# Patient Record
Sex: MALE | Race: WHITE | NOT HISPANIC OR LATINO | Employment: FULL TIME | URBAN - METROPOLITAN AREA
[De-identification: names, ages, dates, MRNs, and addresses within clinical notes are randomized per-mention and may not be internally consistent; named-entity substitution may affect disease eponyms.]

---

## 2018-02-13 ENCOUNTER — OFFICE VISIT CONVERTED (OUTPATIENT)
Dept: NEUROSURGERY | Facility: CLINIC | Age: 33
End: 2018-02-13
Attending: PHYSICIAN ASSISTANT

## 2018-03-05 ENCOUNTER — OFFICE VISIT CONVERTED (OUTPATIENT)
Dept: NEUROSURGERY | Facility: CLINIC | Age: 33
End: 2018-03-05
Attending: PHYSICIAN ASSISTANT

## 2018-04-24 ENCOUNTER — OFFICE VISIT CONVERTED (OUTPATIENT)
Dept: INTERNAL MEDICINE | Facility: CLINIC | Age: 33
End: 2018-04-24
Attending: INTERNAL MEDICINE

## 2018-05-14 ENCOUNTER — OFFICE VISIT CONVERTED (OUTPATIENT)
Dept: INTERNAL MEDICINE | Facility: CLINIC | Age: 33
End: 2018-05-14
Attending: INTERNAL MEDICINE

## 2018-06-14 ENCOUNTER — CONVERSION ENCOUNTER (OUTPATIENT)
Dept: INTERNAL MEDICINE | Facility: CLINIC | Age: 33
End: 2018-06-14

## 2018-06-14 ENCOUNTER — OFFICE VISIT CONVERTED (OUTPATIENT)
Dept: INTERNAL MEDICINE | Facility: CLINIC | Age: 33
End: 2018-06-14
Attending: INTERNAL MEDICINE

## 2018-09-12 ENCOUNTER — OFFICE VISIT CONVERTED (OUTPATIENT)
Dept: INTERNAL MEDICINE | Facility: CLINIC | Age: 33
End: 2018-09-12
Attending: INTERNAL MEDICINE

## 2018-10-09 ENCOUNTER — OFFICE VISIT CONVERTED (OUTPATIENT)
Dept: INTERNAL MEDICINE | Facility: CLINIC | Age: 33
End: 2018-10-09
Attending: INTERNAL MEDICINE

## 2019-02-12 ENCOUNTER — HOSPITAL ENCOUNTER (OUTPATIENT)
Dept: FAMILY MEDICINE CLINIC | Facility: CLINIC | Age: 34
Discharge: HOME | End: 2019-02-12
Attending: NURSE PRACTITIONER

## 2019-02-12 ENCOUNTER — OFFICE VISIT CONVERTED (OUTPATIENT)
Dept: FAMILY MEDICINE CLINIC | Facility: CLINIC | Age: 34
End: 2019-02-12
Attending: NURSE PRACTITIONER

## 2019-02-14 LAB — BACTERIA SPEC AEROBE CULT: NORMAL

## 2019-05-10 ENCOUNTER — OFFICE VISIT CONVERTED (OUTPATIENT)
Dept: FAMILY MEDICINE CLINIC | Facility: CLINIC | Age: 34
End: 2019-05-10
Attending: NURSE PRACTITIONER

## 2019-05-10 ENCOUNTER — CONVERSION ENCOUNTER (OUTPATIENT)
Dept: FAMILY MEDICINE CLINIC | Facility: CLINIC | Age: 34
End: 2019-05-10

## 2019-05-16 ENCOUNTER — HOSPITAL ENCOUNTER (OUTPATIENT)
Dept: OTHER | Facility: HOSPITAL | Age: 34
Discharge: HOME | End: 2019-05-16
Attending: NURSE PRACTITIONER

## 2019-05-23 ENCOUNTER — CONVERSION ENCOUNTER (OUTPATIENT)
Dept: SURGERY | Facility: CLINIC | Age: 34
End: 2019-05-23

## 2019-05-23 ENCOUNTER — OFFICE VISIT CONVERTED (OUTPATIENT)
Dept: SURGERY | Facility: CLINIC | Age: 34
End: 2019-05-23
Attending: SURGERY

## 2019-05-31 ENCOUNTER — HOSPITAL ENCOUNTER (OUTPATIENT)
Dept: PERIOP | Facility: HOSPITAL | Age: 34
Setting detail: HOSPITAL OUTPATIENT SURGERY
Discharge: HOME | End: 2019-05-31
Attending: SURGERY

## 2019-06-06 ENCOUNTER — CONVERSION ENCOUNTER (OUTPATIENT)
Dept: SURGERY | Facility: CLINIC | Age: 34
End: 2019-06-06

## 2019-06-06 ENCOUNTER — OFFICE VISIT CONVERTED (OUTPATIENT)
Dept: SURGERY | Facility: CLINIC | Age: 34
End: 2019-06-06
Attending: SURGERY

## 2019-08-23 ENCOUNTER — CONVERSION ENCOUNTER (OUTPATIENT)
Dept: FAMILY MEDICINE CLINIC | Facility: CLINIC | Age: 34
End: 2019-08-23

## 2019-08-23 ENCOUNTER — OFFICE VISIT CONVERTED (OUTPATIENT)
Dept: FAMILY MEDICINE CLINIC | Facility: CLINIC | Age: 34
End: 2019-08-23
Attending: NURSE PRACTITIONER

## 2019-08-23 ENCOUNTER — HOSPITAL ENCOUNTER (OUTPATIENT)
Dept: FAMILY MEDICINE CLINIC | Facility: CLINIC | Age: 34
Discharge: HOME | End: 2019-08-23
Attending: NURSE PRACTITIONER

## 2019-08-23 LAB
25(OH)D3 SERPL-MCNC: 39.1 NG/ML (ref 30–100)
CK SERPL-CCNC: 190 U/L (ref 57–374)
FERRITIN SERPL-MCNC: 115 NG/ML (ref 30–300)
FOLATE SERPL-MCNC: 9.1 NG/ML (ref 4.8–20)
IRON SATN MFR SERPL: 43 % (ref 20–55)
IRON SERPL-MCNC: 234 UG/DL (ref 70–180)
T4 FREE SERPL-MCNC: 1 NG/DL (ref 0.9–1.8)
TIBC SERPL-MCNC: 539 UG/DL (ref 245–450)
TRANSFERRIN SERPL-MCNC: 377 MG/DL (ref 215–365)
TSH SERPL-ACNC: 1.42 M[IU]/L (ref 0.27–4.2)
VIT B12 SERPL-MCNC: 619 PG/ML (ref 211–911)

## 2019-09-07 ENCOUNTER — OFFICE VISIT CONVERTED (OUTPATIENT)
Dept: FAMILY MEDICINE CLINIC | Facility: CLINIC | Age: 34
End: 2019-09-07
Attending: NURSE PRACTITIONER

## 2019-09-07 ENCOUNTER — HOSPITAL ENCOUNTER (OUTPATIENT)
Dept: FAMILY MEDICINE CLINIC | Facility: CLINIC | Age: 34
Discharge: HOME | End: 2019-09-07
Attending: NURSE PRACTITIONER

## 2019-09-16 LAB — CONV HEMOCHROMATOSIS MUTATION (C282Y,H63D,565C): NORMAL

## 2019-10-01 ENCOUNTER — OFFICE VISIT CONVERTED (OUTPATIENT)
Dept: FAMILY MEDICINE CLINIC | Facility: CLINIC | Age: 34
End: 2019-10-01
Attending: NURSE PRACTITIONER

## 2020-04-04 ENCOUNTER — TELEMEDICINE CONVERTED (OUTPATIENT)
Dept: FAMILY MEDICINE CLINIC | Facility: CLINIC | Age: 35
End: 2020-04-04
Attending: NURSE PRACTITIONER

## 2020-12-09 ENCOUNTER — HOSPITAL ENCOUNTER (OUTPATIENT)
Dept: FAMILY MEDICINE CLINIC | Facility: CLINIC | Age: 35
Discharge: HOME | End: 2020-12-09
Attending: NURSE PRACTITIONER

## 2020-12-09 ENCOUNTER — OFFICE VISIT CONVERTED (OUTPATIENT)
Dept: FAMILY MEDICINE CLINIC | Facility: CLINIC | Age: 35
End: 2020-12-09
Attending: NURSE PRACTITIONER

## 2020-12-09 LAB
ALBUMIN SERPL-MCNC: 5.1 G/DL (ref 3.5–5)
ALBUMIN/GLOB SERPL: 1.8 {RATIO} (ref 1.4–2.6)
ALP SERPL-CCNC: 81 U/L (ref 53–128)
ALT SERPL-CCNC: 37 U/L (ref 10–40)
ANION GAP SERPL CALC-SCNC: 16 MMOL/L (ref 8–19)
AST SERPL-CCNC: 32 U/L (ref 15–50)
BASOPHILS # BLD AUTO: 0.03 10*3/UL (ref 0–0.2)
BASOPHILS NFR BLD AUTO: 0.5 % (ref 0–3)
BILIRUB SERPL-MCNC: 0.41 MG/DL (ref 0.2–1.3)
BUN SERPL-MCNC: 11 MG/DL (ref 5–25)
BUN/CREAT SERPL: 9 {RATIO} (ref 6–20)
CALCIUM SERPL-MCNC: 10.1 MG/DL (ref 8.7–10.4)
CHLORIDE SERPL-SCNC: 103 MMOL/L (ref 99–111)
CONV ABS IMM GRAN: 0.01 10*3/UL (ref 0–0.2)
CONV CO2: 28 MMOL/L (ref 22–32)
CONV IMMATURE GRAN: 0.2 % (ref 0–1.8)
CONV TOTAL PROTEIN: 7.9 G/DL (ref 6.3–8.2)
CREAT UR-MCNC: 1.23 MG/DL (ref 0.7–1.2)
DEPRECATED RDW RBC AUTO: 41 FL (ref 35.1–43.9)
EOSINOPHIL # BLD AUTO: 0.28 10*3/UL (ref 0–0.7)
EOSINOPHIL # BLD AUTO: 4.8 % (ref 0–7)
ERYTHROCYTE [DISTWIDTH] IN BLOOD BY AUTOMATED COUNT: 12.6 % (ref 11.6–14.4)
FERRITIN SERPL-MCNC: 133 NG/ML (ref 30–300)
GFR SERPLBLD BASED ON 1.73 SQ M-ARVRAT: >60 ML/MIN/{1.73_M2}
GLOBULIN UR ELPH-MCNC: 2.8 G/DL (ref 2–3.5)
GLUCOSE SERPL-MCNC: 55 MG/DL (ref 70–99)
HCT VFR BLD AUTO: 50.9 % (ref 42–52)
HGB BLD-MCNC: 17.5 G/DL (ref 14–18)
IRON SATN MFR SERPL: 30 % (ref 20–55)
IRON SERPL-MCNC: 151 UG/DL (ref 70–180)
LYMPHOCYTES # BLD AUTO: 1.64 10*3/UL (ref 1–5)
LYMPHOCYTES NFR BLD AUTO: 28.3 % (ref 20–45)
MCH RBC QN AUTO: 30.9 PG (ref 27–31)
MCHC RBC AUTO-ENTMCNC: 34.4 G/DL (ref 33–37)
MCV RBC AUTO: 89.8 FL (ref 80–96)
MONOCYTES # BLD AUTO: 0.39 10*3/UL (ref 0.2–1.2)
MONOCYTES NFR BLD AUTO: 6.7 % (ref 3–10)
NEUTROPHILS # BLD AUTO: 3.44 10*3/UL (ref 2–8)
NEUTROPHILS NFR BLD AUTO: 59.5 % (ref 30–85)
NRBC CBCN: 0 % (ref 0–0.7)
OSMOLALITY SERPL CALC.SUM OF ELEC: 291 MOSM/KG (ref 273–304)
PLATELET # BLD AUTO: 248 10*3/UL (ref 130–400)
PMV BLD AUTO: 10.7 FL (ref 9.4–12.4)
POTASSIUM SERPL-SCNC: 4.5 MMOL/L (ref 3.5–5.3)
RBC # BLD AUTO: 5.67 10*6/UL (ref 4.7–6.1)
SODIUM SERPL-SCNC: 142 MMOL/L (ref 135–147)
T4 FREE SERPL-MCNC: 1.2 NG/DL (ref 0.9–1.8)
TIBC SERPL-MCNC: 509 UG/DL (ref 245–450)
TRANSFERRIN SERPL-MCNC: 356 MG/DL (ref 215–365)
TSH SERPL-ACNC: 1.89 M[IU]/L (ref 0.27–4.2)
WBC # BLD AUTO: 5.79 10*3/UL (ref 4.8–10.8)

## 2021-05-07 NOTE — PROGRESS NOTES
Progress Note      Patient Name: Devendra Rodriguez   Patient ID: [id number]   Sex: Male   YOB: 1985    Primary Care Provider: Dinorah PATE   Referring Provider: Dinorah PATE    Visit Date: December 9, 2020    Provider: HERLINDA Carranza   Location: [facility]   Location Address: [address] [place] [address] [place]  [address]   Location Phone: [phone]          Chief Complaint     discuss meds       History Of Present Illness  Devendra Rodriguez is a 35 year old /White male who presents for evaluation and treatment of:      follow up on anxiety - he has been  out of his medications for a while now - reports feeling anxious and tense all of the time - his muscles hurt d/t being so tense - he can just snap at any moment and that is not good for his kids or his relationship - in a new relationship for almost 1 year following divorce from his wife - they had been together since . They are sharing custody 50-50 right now- he actually hasn't spoken to his ex-wife in months.     He does not want to continue Zoloft - states that it felt like being on a really bad 'downer' - he had taken Pristiq for about 4 years prior to that and just didn't feel like it worked like he needed it to. He has not tried any others that he can recall. He did try the Topamax but states it didn't really help with the nightmares/night terrors - it also made him really sleepy and he can't afford to not wake up and miss work or be late for work. When he goes to bed he falls asleep okay, but when he wakes up in the middle of the night he cannot shut his brain off.     PHQ-9 score is 11. Denies HI/SI.     Labs from last year reviewed - elevated iron studies. Normal TSH.       Past Medical History  Disease Name Date Onset Notes   Anxiety --  --    GERD (gastroesophageal reflux disease) --  --    Hypertension, Benign Essential --  --          Past Surgical History  Procedure Name Date  Notes   EGD (Endoscopy) 1-23-17 Dr. Mishra         Allergy List  Allergen Name Date Reaction Notes   NO KNOWN DRUG ALLERGIES --  --  --          Social History  Finding Status Start/Stop Quantity Notes   Alcohol Current every day --/-- --  drinks alcohol, 8-14 drinks per week   Exercises regularly --  --/-- --  occasionally   Recreational Drug Use Never --/-- --  never used   Tobacco Current every day --/-- --  currently uses other tobacco products   Uses seatbelts --  --/-- --  yes         Review of Systems  · Constitutional  o Admits  o : fatigue  o Denies  o : fever, chills, body aches  · HENT  o Denies  o : headaches  · Cardiovascular  o Denies  o : chest pain, syncope, dyspnea on exertion, lower extremity edema, lightheadedness, palpitations  · Respiratory  o Denies  o : shortness of breath, cough  · Gastrointestinal  o Denies  o : nausea, vomiting, diarrhea, loss of appetite, abdominal pain  · Integument  o Denies  o : rash, pigmentation changes, skin dryness, hair growth change, nail changes  · Neurologic  o Denies  o : dizziness  · Musculoskeletal  o Admits  o : muscle pain - from tension  o Denies  o : joint pain, joint swelling  · Psychiatric  o Admits  o : anxiety, depression, difficulty sleeping, excessive anger  o Denies  o : suicidal ideation, homicidal ideation      Vitals  Date Time BP Position Site L\R Cuff Size HR RR TEMP (F) WT  HT  BMI kg/m2 BSA m2 O2 Sat FR L/min FiO2 HC       12/09/2020 08:09 /84 Sitting    110 - R   178lbs 0oz    97 %            Physical Examination  · Constitutional  o Appearance  o : well developed, well-nourished, in no acute distress  · Neck  o Inspection/Palpation  o : normal appearance, no masses or tenderness, trachea midline  o Thyroid  o : no thyromegaly  · Respiratory  o Respiratory Effort  o : breathing unlabored  o Auscultation of Lungs  o : clear to auscultation  · Cardiovascular  o Heart  o :   § Auscultation of Heart  § : regular rate, normal rhythm, no  murmurs present  o Peripheral Vascular System  o :   § Extremities  § : no edema  · Lymphatic  o Neck  o : no lymphadenopathy present  · Musculoskeletal  o General  o :   § General Musculoskeletal  § : Muscle tone, strength, and development grossly normal.  · Skin and Subcutaneous Tissue  o General Inspection  o : no lesions present, no areas of discoloration, skin turgor normal, texture normal  · Neurologic  o Gait and Station  o :   § Gait Screening  § : normal gait  · Psychiatric  o Mood and Affect  o : mood normal, affect appropriate          Assessment  · Anxiety disorder     300.00/F41.9  · Fatigue     780.79/R53.83  · Depression     311/F32.9  · Abnormal iron saturation     790.6/R79.0      Plan  · Orders  o CBC with Auto Diff [facility] (19548) - 300.00/F41.9, 311/F32.9, 790.6/R79.0, 780.79/R53.83 - 12/09/2020  o CMP [facility] (50516) - 300.00/F41.9, 311/F32.9 - 12/09/2020  o Thyroid Profile (THYII) - 300.00/F41.9, 311/F32.9, 780.79/R53.83 - 12/09/2020  o ACO-18: Positive screen for clinical depression using a standardized tool and a follow-up plan documented () - - 12/09/2020  o ACO-39: Current medications updated and reviewed (1159F, ) - - 12/09/2020  o Iron Profile (Iron 01880 TIBC 45779 and Transferrin 43942) (IRONP) - 790.6/R79.0, 780.79/R53.83 - 12/09/2020  o Ferritin ser/plas (34248) - 790.6/R79.0, 780.79/R53.83 - 12/09/2020  · Medications  o buspirone 10 mg oral tablet   SIG: take 1 tablet (10 mg) by oral route 2 times per day   DISP: (60) Tablet with 0 refills  Prescribed on 12/09/2020     o escitalopram oxalate 10 mg oral tablet   SIG: take 1 tablet (10 mg) by oral route once daily   DISP: (30) Tablet with 0 refills  Prescribed on 12/09/2020     o buspirone 10 mg oral tablet   SIG: take 1 tablet (10 mg) by oral route 2 times per day   DISP: (60) tablets with 2 refills  Discontinued on 12/09/2020     o clotrimazole 1 % topical cream   SIG: apply to the affected and surrounding areas of skin by topical  route 2 times per day in the morning and evening for 14 days   DISP: (1) 15 gm tube with 0 refills  Discontinued on 12/09/2020     o lisinopril 20 mg oral tablet   SIG: take 1 tablet (20 mg) by oral route once daily for 30 days   DISP: (30) Tablet with 2 refills  Discontinued on 12/09/2020     o naproxen 500 mg oral tablet   SIG: take 1 tablet (500 mg) by oral route 2 times per day with food   DISP: (60) tablets with 2 refills  Discontinued on 12/09/2020     o omeprazole 40 mg oral capsule,delayed release(DR/EC)   SIG: take 1 capsule (40 mg) by oral route once daily before a meal   DISP: (30) capsules with 2 refills  Discontinued on 12/09/2020     o sertraline 100 mg oral tablet   SIG: take 1 tablet (100 mg) by oral route once daily for 30 days   DISP: (30) Tablet with 2 refills  Discontinued on 12/09/2020     o topiramate 25 mg oral tablet   SIG: take 1 tablet (25 mg) by oral route once daily at bedtime   DISP: (30) tablets with 0 refills  Discontinued on 12/09/2020     o Medications have been Reconciled  o Transition of Care or Provider Policy  · Instructions  o Patient was educated/instructed on their diagnosis, treatment and medications prior to discharge from the clinic today. Restart Buspar - he has tolerated this well in the past and notes that it does help with his symptoms - Trial of Lexapro - follow up with me in one month. Denies any HI/SI. At this time, we will defer medication for insomnia - he may attempt Benadryl if desired; however, he states even melatonin knocks him out.             Electronically Signed by: HERLINDA Carranza -Author on December 9, 2020 08:41:25 AM

## 2021-05-07 NOTE — PROGRESS NOTES
Progress Note      Patient Name: Devendra Rodriguez   Patient ID: [id number]   Sex: Male   YOB: 1985    Primary Care Provider: Dinorah PATE   Referring Provider: Dinorah PATE    Visit Date: September 7, 2019    Provider: HERLINDA Carranza   Location: [facility]   Location Address: [address] [place] [address] [place]  [address]   Location Phone: [phone]          Chief Complaint     follow up       History Of Present Illness  Devendra Rodriguez is a 33 year old /White male who presents for evaluation and treatment of:      follow up on anxiety and blood pressure     He can tell a difference in everything since he was last here -- he reports 100% improvement since he was here 2 weeks ago, but 50% better overall/general symptoms.     No c/o chest pain, palpitations, headaches, dizziness, or fatigue.     His blood work showed iron overload. He is not taking an iron supplement.       Past Medical History  Disease Name Date Onset Notes   Anxiety --  --    GERD (gastroesophageal reflux disease) --  --          Past Surgical History  Procedure Name Date Notes   EGD (Endoscopy) 1-23-17 Dr. Mishra         Medication List  Name Date Started Instructions   buspirone 10 mg oral tablet 08/23/2019 take 1 tablet (10 mg) by oral route 2 times per day   desvenlafaxine succinate 50 mg oral tablet extended release 24 hr 08/23/2019 take 1 tablet (50 mg) by oral route once daily   lisinopril 10 mg oral tablet 08/23/2019 take 1 tablet (10 mg) by oral route once daily   naproxen 500 mg oral tablet 07/05/2019 take 1 tablet (500 mg) by oral route 2 times per day with food   omeprazole 40 mg oral capsule,delayed release(/EC) 05/10/2019 take 1 capsule (40 mg) by oral route once daily before a meal   sertraline 50 mg oral tablet 08/23/2019 take 1 tablet (50 mg) by oral route once daily         Allergy List  Allergen Name Date Reaction Notes   NO KNOWN DRUG ALLERGIES --  --   --          Social History  Finding Status Start/Stop Quantity Notes   Alcohol Current every day --/-- --  drinks alcohol, 8-14 drinks per week   Exercises regularly --  --/-- --  occasionally   Recreational Drug Use Never --/-- --  never used   Tobacco Current every day --/-- --  currently uses other tobacco products   Uses seatbelts --  --/-- --  yes         Review of Systems  · Constitutional  o Admits  o : good general health lately  · Cardiovascular  o Denies  o : chest pain, dyspnea on exertion, lower extremity edema, palpitations  · Respiratory  o Denies  o : shortness of breath  · Gastrointestinal  o Denies  o : nausea, vomiting, diarrhea, abdominal pain  · Neurologic  o Denies  o : dizziness  · Musculoskeletal  o Denies  o : joint pain, joint swelling  · Psychiatric  o Admits  o : anxiety  o Denies  o : depression, difficulty sleeping, suicidal ideation, homicidal ideation      Vitals  Date Time BP Position Site L\R Cuff Size HR RR TEMP (F) WT  HT  BMI kg/m2 BSA m2 O2 Sat        09/07/2019 08:57 /84 Sitting    96 - R   168lbs 0oz    99 %          Physical Examination  · Constitutional  o Appearance  o : well developed, well-nourished, in no acute distress  · Respiratory  o Respiratory Effort  o : breathing unlabored  o Auscultation of Lungs  o : clear to ascultation  · Cardiovascular  o Heart  o :   § Auscultation of Heart  § : regular rate, normal rhythm, no murmurs present  o Peripheral Vascular System  o :   § Carotid Arteries  § : normal pulses bilaterally, no bruits present  § Pedal Pulses  § : bilateral pulse strength 2+  § Extremities  § : no edema  · Lymphatic  o Neck  o : no lymphadenopathy present  · Musculoskeletal  o General  o :   § General Musculoskeletal  § : Muscle tone, strength, and development grossly normal.  · Skin and Subcutaneous Tissue  o General Inspection  o : no lesions present, no areas of discoloration, skin turgor normal, texture normal  · Neurologic  o Gait and  "Station  o :   § Gait Screening  § : normal gait  · Psychiatric  o Mood and Affect  o : mood normal, affect appropriate          Assessment  · Anxiety disorder     300.00/F41.9  · Essential hypertension     401.9/I10  · Abnormal iron saturation     790.6/R79.0      Plan  · Orders  o ACO-39: Current medications updated and reviewed () - - 09/07/2019  o Hemochromatosis (97256) - 790.6/R79.0 - 09/07/2019  · Medications  o desvenlafaxine succinate 25 mg oral tablet extended release 24 hr   SIG: take 1 tablet by oral route daily   DISP: (14) tablets with 0 refills  Adjusted on 09/07/2019     o sertraline 100 mg oral tablet   SIG: take 1 tablet (100 mg) by oral route once daily   DISP: (30) tablets with 0 refills  Adjusted on 09/07/2019     o buspirone 10 mg oral tablet   SIG: take 1 tablet (10 mg) by oral route 2 times per day   DISP: (60) tablets with 0 refills  Refilled on 09/07/2019     o lisinopril 10 mg oral tablet   SIG: take 1 tablet (10 mg) by oral route once daily   DISP: (30) tablets with 0 refills  Refilled on 09/07/2019     o omeprazole 40 mg oral capsule,delayed release(DR/EC)   SIG: take 1 capsule (40 mg) by oral route once daily before a meal   DISP: (30) capsules with 5 refills  Refilled on 09/07/2019     · Instructions  o Patient was given an SSRI/SSNRI medication and warned of possible side effects of the medication including potential for increase risk of sucicidal thoughts and feelings. Patient was instructed that if they begin to exhibit any of these effects they will discontinue the medication immediately and contact our office or the ER ASAP.   o Patient agrees to a \"No Self Harm\" contract. Patient will either call us, [facility], ER, [facility], [facility].  o Take all medications as prescribed/directed.  o Patient was educated/instructed on their diagnosis, treatment and medications prior to discharge from the clinic today.  o Chronic conditions reviewed and taken " into consideration for today's treatment plan.  · Disposition  o Call or Return if symptoms worsen or persist.            Electronically Signed by: HERLINDA Carranza -Author on September 7, 2019 09:30:22 AM

## 2021-05-07 NOTE — PROGRESS NOTES
Progress Note      Patient Name: Devendra Rodriguez   Patient ID: [id number]   Sex: Male   YOB: 1985    Primary Care Provider: Dinorah PATE   Referring Provider: Dinorah PATE    Visit Date: October 1, 2019    Provider: HERLINDA Carranza   Location: [facility]   Location Address: [address] [place] [address] [place]  [address]   Location Phone: [phone]          Chief Complaint     follow up on meds       History Of Present Illness  Devendra Rodriguez is a 33 year old /White male who presents for evaluation and treatment of:      follow up on meds.     He has completely weaned off of Pristiq -- now on Zoloft 100mg once daily, and Buspar BID -- he can tell when he misses a dose of the Zoloft -- feels like he can't focus. Overall, he feels good. He still doesn't sleep well half the time. He does not take any OTC sleep aids.     He is currently laid off from [employer] -- states they wanted him to work 12 hour shifts, 3rd shift, and with his kids at home with him 1/2 the time he couldn't commit to that. They laid him off. He is seeking employment elsewhere.     His BP is up today -- he is taking 10mg Lisinopril daily -- no chest pain, palpitations, headaches, or shortness of breath.       Past Medical History  Disease Name Date Onset Notes   Anxiety --  --    GERD (gastroesophageal reflux disease) --  --          Past Surgical History  Procedure Name Date Notes   EGD (Endoscopy) 1-23-17 Dr. Mishra         Medication List  Name Date Started Instructions   buspirone 10 mg oral tablet 10/01/2019 take 1 tablet (10 mg) by oral route 2 times per day   lisinopril 20 mg oral tablet 10/01/2019 take 1 tablet (20 mg) by oral route once daily   naproxen 500 mg oral tablet 07/05/2019 take 1 tablet (500 mg) by oral route 2 times per day with food   omeprazole 40 mg oral capsule,delayed release(/EC) 09/07/2019 take 1 capsule (40 mg) by oral route once daily  before a meal   sertraline 100 mg oral tablet 10/01/2019 take 1 tablet (100 mg) by oral route once daily         Allergy List  Allergen Name Date Reaction Notes   NO KNOWN DRUG ALLERGIES --  --  --          Social History  Finding Status Start/Stop Quantity Notes   Alcohol Current every day --/-- --  drinks alcohol, 8-14 drinks per week   Exercises regularly --  --/-- --  occasionally   Recreational Drug Use Never --/-- --  never used   Tobacco Current every day --/-- --  currently uses other tobacco products   Uses seatbelts --  --/-- --  yes         Review of Systems  · Constitutional  o Admits  o : fatigue, good general health lately  · Cardiovascular  o Denies  o : chest pain, syncope, dyspnea on exertion, lower extremity edema, lightheadedness, palpitations  · Respiratory  o Denies  o : shortness of breath, cough  · Gastrointestinal  o Denies  o : nausea, vomiting, diarrhea, abdominal pain  · Psychiatric  o Admits  o : anxiety, difficulty sleeping  o Denies  o : depression, suicidal ideation, homicidal ideation      Vitals  Date Time BP Position Site L\R Cuff Size HR RR TEMP (F) WT  HT  BMI kg/m2 BSA m2 O2 Sat HC       10/01/2019 03:16 /90 Sitting    106 - R   174lbs 16oz    98 %    10/01/2019 03:53 /84 Sitting                     Physical Examination  · Constitutional  o Appearance  o : well developed, well-nourished, in no acute distress  · Respiratory  o Respiratory Effort  o : breathing unlabored  o Auscultation of Lungs  o : clear to ascultation  · Cardiovascular  o Heart  o :   § Auscultation of Heart  § : regular rate and rhythm  o Peripheral Vascular System  o :   § Extremities  § : no edema  · Lymphatic  o Neck  o : no lymphadenopathy present  · Musculoskeletal  o General  o :   § General Musculoskeletal  § : Muscle tone, strength, and development grossly normal.  · Skin and Subcutaneous Tissue  o General Inspection  o : no lesions present, no areas of discoloration, skin turgor normal,  "texture normal  · Neurologic  o Gait and Station  o :   § Gait Screening  § : normal gait  · Psychiatric  o Mood and Affect  o : mood normal, affect appropriate          Assessment  · Anxiety disorder     300.00/F41.9  · Essential hypertension     401.9/I10  · Depression     311/F32.9    Problems Reconciled  Plan  · Orders  o ACO-39: Current medications updated and reviewed () - - 10/01/2019  · Medications  o lisinopril 20 mg oral tablet   SIG: take 1 tablet (20 mg) by oral route once daily   DISP: (30) tablets with 0 refills  Adjusted on 10/01/2019     o buspirone 10 mg oral tablet   SIG: take 1 tablet (10 mg) by oral route 2 times per day   DISP: (60) tablets with 0 refills  Refilled on 10/01/2019     o sertraline 100 mg oral tablet   SIG: take 1 tablet (100 mg) by oral route once daily   DISP: (30) tablets with 0 refills  Refilled on 10/01/2019     o Medications have been Reconciled  o Transition of Care or Provider Policy  · Instructions  o Patient was given an SSRI/SSNRI medication and warned of possible side effects of the medication including potential for increase risk of sucicidal thoughts and feelings. Patient was instructed that if they begin to exhibit any of these effects they will discontinue the medication immediately and contact our office or the ER ASAP.   o Patient agrees to a \"No Self Harm\" contract. Patient will either call us, [facility], ER, [facility], [facility].  o Patient advised to monitor blood pressure (B/P) at home and journal readings. Patient informed that a B/P reading at home of more than 135/85 is considered hypertension. For readings greater epup978/90 or higher patient is advised to follow up in the office with readings for management. Patient advised to limit sodium intake.  o Take all medications as prescribed/directed.  o Patient was educated/instructed on their diagnosis, treatment and medications prior to discharge from the clinic today. " Increase Lisinopril to 20mg daily -- follow up with BP check in 2 weeks -- he is out of town next week - leaving tomorrow. Keep Buspar and Zoloft the same -- follow up in 1 month.   o Chronic conditions reviewed and taken into consideration for today's treatment plan.  · Disposition  o Call or Return if symptoms worsen or persist.            Electronically Signed by: HERLINDA Carranza -Author on October 1, 2019 04:11:20 PM

## 2021-05-07 NOTE — PROGRESS NOTES
"   Progress Note      Patient Name: Devendra Rodriguez   Patient ID: [id number]   Sex: Male   YOB: 1985        Visit Date: May 10, 2019    Provider: HERLINDA Carranza   Location: [facility]   Location Address: [address] [place] [address]  [id number]   Location Phone: [phone]          Chief Complaint     refill meds  sinus congestion and pressure       History Of Present Illness  Devendra Rodriguez is a 33 year old /White male who presents for evaluation and treatment of:      he is here today for refills on Pristiq, Buspar, Prilosec, and he is not feeling well.     Depression with anxiety -- he has been taking Pristiq 100mg since 2010. It was started by Dr. Rios. But he states that Dr. Rios always wanted to change his medications around. He then switched to Dr. Villalobos in [place]. Dr. Villalobos resumed Pristiq, and then at one point last year gave him Xanax. He took 2 of those and all they did was make him want to go to sleep, even during the day. He could not function that way. He started Buspar after that. He thinks he is on 5mg BID, but it could be 10mg. He feels like the combination does help. When he doesn't have his medications he can tell --increased anxiety, hands will shake, and he just doesn't want to be around people. He denies any HI/SI.     He has had cold symptoms for over a week now -- getting worse -- can't breathe, congested --felt really hot this morning, then chilled. He is not able to blow much out, mainly congested. He is coughing with mucous production --thick and green. He is not normally bothered by allergies.     He would also like a referral to general surgery for removal of a \"knot\" on the right anterior chest well. It has been there for 11 months and is getting bigger. Stayed the size of a bb for a long time, but over the past 1 year it has grown bigger.    He has chronic GERD -- has had an EGD with Dr. Mishra in the past -- he " takes omeprazole daily. If he misses even 1 dose he will vomit the next day. No current c/o nausea, vomiting, abdominal pain, or dysphagia.       Past Medical History  Disease Name Date Onset Notes   Anxiety --  --    GERD (gastroesophageal reflux disease) --  --          Medication List  Name Date Started Instructions   desvenlafaxine succinate 100 mg oral tablet extended release 24 hr 05/10/2019 take 1 tablet (100 mg) by oral route once daily for 30 days   omeprazole 40 mg oral capsule,delayed release(DR/EC) 05/10/2019 take 1 capsule (40 mg) by oral route once daily before a meal         Allergy List  Allergen Name Date Reaction Notes   NO KNOWN DRUG ALLERGIES --  --  --          Social History  Finding Status Start/Stop Quantity Notes   Alcohol Current every day --/-- --  drinks alcohol, 8-14 drinks per week   Exercises regularly --  --/-- --  occasionally   Recreational Drug Use Never --/-- --  never used   Tobacco Current - status unknown --/-- --  currently uses other tobacco products   Uses seatbelts --  --/-- --  yes         Review of Systems  · Constitutional  o Admits  o : good general health lately  · Eyes  o Denies  o : discharge from eye  · HENT  o Admits  o : sinus pain, nasal congestion, nasal discharge, postnasal drip  o Denies  o : headaches, sore throat, tinnitus, ear pain, ear fullness  · Cardiovascular  o Denies  o : chest pain, palpitations  · Respiratory  o Admits  o : productive cough  o Denies  o : shortness of breath, wheezing  · Gastrointestinal  o Admits  o : heartburn, reflux  o Denies  o : nausea, vomiting, diarrhea, constipation, dysphagia, abdominal pain  · Genitourinary  o Denies  o : dysuria, urinary retention  · Integument  o Admits  o : new skin lesions  · Neurologic  o Denies  o : dizziness  · Musculoskeletal  o Denies  o : joint pain, joint swelling  · Endocrine  o Denies  o : cold intolerance, heat intolerance  · Psychiatric  o Admits  o : anxiety, depression  o Denies  o :  "difficulty sleeping, suicidal ideation, homicidal ideation      Vitals  Date Time BP Position Site L\R Cuff Size HR RR TEMP (F) WT  HT  BMI kg/m2 BSA m2 O2 Sat HC       05/10/2019 02:04 /90 Sitting    97 - R  98 172lbs 0oz 5'  7\" 26.94 1.92 97 %          Physical Examination  · Constitutional  o Appearance  o : well developed, well-nourished, in no acute distress  · Eyes  o Conjunctivae  o : conjunctivae normal, no exudates present  o Pupils and Irises  o : pupils equal and round, pupils reactive to light bilaterally  · Ears, Nose, Mouth and Throat  o Ears  o :   § External Ears  § : auricle appearance normal bilaterally, no auricle tenderness to palpation present, external auditory canal appearance within normal limits, no discharge present  § Otoscopic Examination  § : tympanic membrane appearance within normal limits bilaterally  § Hearing  § : response to sound normal, no tinnitus  o Nose  o :   § External Nose  § : appearance normal  § Intranasal Exam  § : mucosa within normal limits, maxillary sinus tender to exam by percussion   o Throat  o :   § Oropharynx  § : nasopharyngeal discharge noted, tonsils within normal limits  · Neck  o Inspection/Palpation  o : supple  · Respiratory  o Respiratory Effort  o : breathing unlabored  o Auscultation of Lungs  o : clear to ascultation  · Cardiovascular  o Heart  o :   § Auscultation of Heart  § : regular rate and rhythm  o Peripheral Vascular System  o :   § Extremities  § : no edema  · Gastrointestinal  o Abdominal Examination  o :   § Abdomen  § : soft, non-tender, non-distended, bowel sounds +  · Lymphatic  o Neck  o : no lymphadenopathy present  · Musculoskeletal  o General  o :   § General Musculoskeletal  § : Muscle tone, strength, and development grossly normal. There is a palpable deformity of the right anterior chest wall -- approximately 20mm in diameter --soft, non-tender, mobile  · Skin and Subcutaneous Tissue  o General Inspection  o : no lesions " "present, no areas of discoloration, skin turgor normal, texture normal  · Neurologic  o Gait and Station  o :   § Gait Screening  § : normal gait  · Psychiatric  o Mood and Affect  o : mood normal, affect appropriate          Assessment  · Anxiety disorder     300.00/F41.9  · GERD (gastroesophageal reflux disease)     530.81/K21.9  · Sinusitis, acute     461.9/J01.90  · Chest wall mass     786.6/R22.2  · Depression     311/F32.9      Plan  · Orders  o ACO-18: Depression screening not completed due to current diagnosis of depression or bipolar disorder () - - 05/10/2019  o ACO-39: Current medications updated and reviewed () - - 05/10/2019  o General Surgery Consult ([facility]) - 786.6/R22.2 - 05/10/2019   [place]  · Medications  o Augmentin 875-125 mg oral tablet   SIG: take 1 tablet by oral route every 12 hours for 10 days   DISP: (20) tablets with 0 refills  Prescribed on 05/10/2019     o montelukast 10 mg oral tablet   SIG: take 1 tablet (10 mg) by oral route once daily in the evening   DISP: (30) tablets with 0 refills  Prescribed on 05/10/2019     o omeprazole 40 mg oral capsule,delayed release(DR/EC)   SIG: take 1 capsule (40 mg) by oral route once daily before a meal   DISP: (30) capsules with 5 refills  Adjusted on 05/10/2019     o desvenlafaxine succinate 100 mg oral tablet extended release 24 hr   SIG: take 1 tablet (100 mg) by oral route once daily for 30 days   DISP: (30) tablets with 5 refills  Adjusted on 05/10/2019     · Instructions  o Patient was given an SSRI/SSNRI medication and warned of possible side effects of the medication including potential for increase risk of sucicidal thoughts and feelings. Patient was instructed that if they begin to exhibit any of these effects they will discontinue the medication immediately and contact our office or the ER ASAP.   o Patient agrees to a \"No Self Harm\" contract. Patient will either call us, [facility], ER, [facility] " Facility.  o Maintain a healthy weight. Avoid tight fitting clothes. Avoid fried, fatty foods, tomato sauce, chocolate, mint, garlic, onion, alcohol. caffeine. Eat smaller meals, dont lie down after a meal, dont smoke. Elevate the head of your bed 6-9 inches.  o Take all medications as prescribed/directed.  o Patient was educated/instructed on their diagnosis, treatment and medications prior to discharge from the clinic today.  o Chronic conditions reviewed and taken into consideration for today's treatment plan.  · Disposition  o Call or Return if symptoms worsen or persist.            Electronically Signed by: HERLINDA Carranza -Author on May 10, 2019 03:12:28 PM

## 2021-05-07 NOTE — PROGRESS NOTES
Quick Note      Patient Name: Devendra Rodriguez   Patient ID: [id number]   Sex: Male   YOB: 1985    Primary Care Provider: Dinorah PATE   Referring Provider: Dinorah PATE    Visit Date: April 4, 2020    Provider: HERLINDA Carranza   Location: [facility]   Location Address: [address] [place] [address]   [place]  [id number]   Location Phone: [phone]          History Of Present Illness  TELEHEALTH VISIT  Chief Complaint: medication refills   Devendra Rodriguez is a 34 year old /White male who is presenting for evaluation via telephone telehealth visit. Verbal consent obtained before beginning visit. His son, Joel, was present during the call.   Provider spent 17 minutes with the patient during telehealth visit.   The following staff were present during this visit: HERLINDA Carranza   Past Medical History/Overview of Patient Symptoms     HTN - he is taking Lisinopril 20mg daily - denies any chest pain, palpitations, headaches, dizziness, shortness of breath, or swelling in the legs.     Anxiety - He is taking sertraline 100mg daily and buspirone 10mg BID - for the most part he feels like things are going okay - he has been out of his medication for a little while; however. He is still having the nightmares - when he falls asleep he will sleep for 2-3 hours and then maybe wake - if he wakes, he will not go back to sleep. He really does not want to see psych. He will not elude to me what the nightmares are about - only suggests that it was childhood trauma-related.     GERD - he reports sx's are stable with omeprazole - long-standing GERD - no dysphagia, nausea, vomiting, or abdominal pain.                Assessment  · Anxiety disorder     300.00/F41.9  · Essential hypertension     401.9/I10  · GERD (gastroesophageal reflux disease)     530.81/K21.9  · Insomnia, unspecified     780.52/G47.00  · PTSD (post-traumatic stress  disorder)     309.81/F43.10      Plan  · Orders  o Physician Telephone Evaluation, 11-20 minutes (40908) - - 04/04/2020  · Medications  o topiramate 25 mg oral tablet   SIG: take 1 tablet (25 mg) by oral route once daily at bedtime   DISP: (30) tablets with 0 refills  Prescribed on 04/04/2020     o buspirone 10 mg oral tablet   SIG: take 1 tablet (10 mg) by oral route 2 times per day   DISP: (60) tablets with 2 refills  Refilled on 04/04/2020     o lisinopril 20 mg oral tablet   SIG: take 1 tablet (20 mg) by oral route once daily for 30 days   DISP: (30) Tablet with 2 refills  Refilled on 04/04/2020     o omeprazole 40 mg oral capsule,delayed release(DR/EC)   SIG: take 1 capsule (40 mg) by oral route once daily before a meal   DISP: (30) capsules with 2 refills  Refilled on 04/04/2020     o sertraline 100 mg oral tablet   SIG: take 1 tablet (100 mg) by oral route once daily for 30 days   DISP: (30) Tablet with 2 refills  Refilled on 04/04/2020     o Medications have been Reconciled  o Transition of Care or Provider Policy  · Instructions  o Chronic conditions reviewed and taken into consideration for today's treatment plan. Will refill his Buspar and Sertraline at current dosing - add topiramate for the night-terrors - 25mg QHS - call me to discuss prior to running out, in 2-3 weeks. Will refill Lisinopril and omeprazole. Will need labs at next face-to-face visit.   · Disposition  o Call or Return if symptoms worsen or persist.            Electronically Signed by: HERLINDA Carranza -Author on April 4, 2020 09:41:35 AM

## 2021-05-07 NOTE — PROGRESS NOTES
Progress Note      Patient Name: Devendra Rodriguez   Patient ID: [id number]   Sex: Male   YOB: 1985        Visit Date: February 12, 2019    Provider: HERLINDA Carranza   Location: [facility]   Location Address: [address] [place] [address]  [id number]   Location Phone: [phone]          Chief Complaint     sore throat and congestion  son positive for strep       History Of Present Illness  Devendra Rodriguez is a 33 year old /White male who presents for evaluation and treatment of:      sore throat--since yesterday--more itchy than sore--dizzy--sweats--no known fever, but hasn't checked himself. His son has had strep twice in the past 2-3 weeks.       Past Medical History  Disease Name Date Onset Notes   Anxiety --  --    GERD (gastroesophageal reflux disease) --  --          Medication List  Name Date Started Instructions   Prilosec oral  --    Pristiq oral  --          Allergy List  Allergen Name Date Reaction Notes   NO KNOWN DRUG ALLERGIES --  --  --          Social History  Finding Status Start/Stop Quantity Notes   Alcohol Current every day --/-- --  drinks alcohol, 8-14 drinks per week   Exercises regularly --  --/-- --  occasionally   Recreational Drug Use Never --/-- --  never used   Tobacco Current - status unknown --/-- --  currently uses other tobacco products   Uses seatbelts --  --/-- --  yes         Review of Systems  · Constitutional  o Admits  o : fatigue, chills, body aches  o Denies  o : fever  · HENT  o Admits  o : sore throat  o Denies  o : headaches, sinus pain, nasal congestion, nasal discharge, tinnitus, ear pain, ear fullness  · Cardiovascular  o Denies  o : chest pain  · Respiratory  o Admits  o : cough  · Gastrointestinal  o Denies  o : nausea, vomiting, diarrhea, abdominal pain  · Integument  o Denies  o : rash, pigmentation changes      Vitals  Date Time BP Position Site L\R Cuff Size HR RR TEMP(F) WT  HT  BMI kg/m2 BSA m2 O2  "[facility]       02/12/2019 09:40 /80 Sitting    89 - R  97.7 161lbs 0oz 5'  7\" 25.22 1.86 97 %           Physical Examination  · Constitutional  o Appearance  o : well developed, well-nourished, in no acute distress  · Eyes  o Conjunctivae  o : conjunctivae normal, no exudates present  o Pupils and Irises  o : pupils equal and round, pupils reactive to light bilaterally  · Ears, Nose, Mouth and Throat  o Ears  o :   § External Ears  § : auricle appearance normal bilaterally, no auricle tenderness to palpation present, external auditory canal appearance within normal limits  § Otoscopic Examination  § : tympanic membrane appearance within normal limits bilaterally  § Hearing  § : response to sound normal, no tinnitus  o Nose  o :   § External Nose  § : appearance normal  § Intranasal Exam  § : mucosa within normal limits  o Throat  o :   § Oropharynx  § : generalized hyperemia noted, tonsils within normal limits  · Neck  o Inspection/Palpation  o : supple  · Respiratory  o Respiratory Effort  o : breathing unlabored  o Auscultation of Lungs  o : clear to auscultation--witnessed cough  · Cardiovascular  o Heart  o :   § Auscultation of Heart  § : regular rate, normal rhythm, no murmurs   o Peripheral Vascular System  o :   § Extremities  § : no edema  · Gastrointestinal  o Abdominal Examination  o :   § Abdomen  § : soft, non-tender, non-distended, bowel sounds +  · Lymphatic  o Neck  o : no lymphadenopathy present  · Musculoskeletal  o General  o :   § General Musculoskeletal  § : No joint swelling or deformity., Muscle tone, strength, and development grossly normal.  · Skin and Subcutaneous Tissue  o General Inspection  o : no lesions present, no areas of discoloration, skin turgor normal, texture normal  · Neurologic  o Gait and Station  o :   § Gait Screening  § : normal gait  · Psychiatric  o Mood and Affect  o : mood normal, affect appropriate              Assessment  · Cough     786.2/R05  · Pharyngitis, " acute     462/J02.9      Plan  · Orders  o IOP - Rapid Strep (89208) - 462/J02.9 - 02/12/2019   negative  o Throat culture and sensitivity (55355) - 462/J02.9 - 02/12/2019  o ACO-14: Influenza immunization was not administered for reasons documented () - - 02/12/2019   out of stock  o ACO-18: Depression screening not completed due to current diagnosis of depression or bipolar disorder () - - 02/12/2019  o ACO-39: Current medications updated and reviewed () - - 02/12/2019  · Medications  o guaifenesin 600 mg oral tablet extended release 12hr   SIG: take 1-2 tablets (1,200 mg) by oral route every 12 hours as needed   DISP: (40) Tablet extended release 12hrs with 0 refills  Prescribed on 02/12/2019     o amoxicillin 875 mg oral tablet   SIG: take 1 tablet (875 mg) by oral route every 12 hours for 10 days   DISP: (20) tablets with 0 refills  Prescribed on 02/12/2019     · Instructions  o Take all medications as prescribed/directed.  o Patient was educated/instructed on their diagnosis, treatment and medications prior to discharge from the clinic today.  · Disposition  o Call or Return if symptoms worsen or persist.            Electronically Signed by: HERLINDA Carranza -Author on February 12, 2019 10:47:57 AM

## 2021-05-07 NOTE — PROGRESS NOTES
Progress Note      Patient Name: Devendra Rodriguez   Patient ID: [id number]   Sex: Male   YOB: 1985    Primary Care Provider: Dinorah PATE   Referring Provider: Dinorah PATE    Visit Date: August 23, 2019    Provider: HERLINDA Carranza   Location: [facility]   Location Address: [address] [place] [address]   [place]  [address]   Location Phone: [phone]          Chief Complaint     high b/p       History Of Present Illness  Devendra Rodriguez is a 33 year old /White male who presents for evaluation and treatment of:      he went to [facility] on Wednesday morning -- was on his way to work -- made it to [employer] and he couldn't see -- everything was blurry -- felt his hands and feet go numb. His whole face was tingling. Lips were numb.     [facility] did EKG and chest x-ray and both were normal. They told him it was a panic attack and gave him two anxiety pills while he was there and he was improved at time of discharge. He got there at 6AM and left after noon. He states that he did get blood work as well.     He is treated for anxiety --he takes Pristique and Buspar -- he has taken Pristique for the past 4 years at least, and recently we started Buspar 10mg BID but he is only taking once daily. He does not feel like his anxiety is under control. He doesn't feel depressed that he knows of, but he feels tired all of the time. He is exhausted. He thinks he is sleeping, but has had problems in the past with sleeping. He sleeps better when his kids are with him then when they are not. He has them 3 days every other weekend and then 2 days the week. They share custody 3-2-2. He is in the middle of a divorce. He feels like his wife is trying to use his anxiety as leverage. He has been admitted for anxiety in the past -- he was admitted on 72 hour hold, but that was the only time.     He has been in counseling in the past and he doesn't feel like that helped. He  feels like when he goes to counseling they just feel sorry for him and he is not looking for pity.     He feels like in the past 10 months he can't focus on anything -- his mind is all over the place.       Past Medical History  Disease Name Date Onset Notes   Anxiety --  --    GERD (gastroesophageal reflux disease) --  --          Past Surgical History  Procedure Name Date Notes   EGD (Endoscopy) 1-23-17 Dr. Mishra         Medication List  Name Date Started Instructions   buspirone 10 mg oral tablet 08/23/2019 take 1 tablet (10 mg) by oral route 2 times per day   desvenlafaxine succinate 50 mg oral tablet extended release 24 hr 08/23/2019 take 1 tablet (50 mg) by oral route once daily   naproxen 500 mg oral tablet 07/05/2019 take 1 tablet (500 mg) by oral route 2 times per day with food   omeprazole 40 mg oral capsule,delayed release(/EC) 05/10/2019 take 1 capsule (40 mg) by oral route once daily before a meal         Allergy List  Allergen Name Date Reaction Notes   NO KNOWN DRUG ALLERGIES --  --  --          Social History  Finding Status Start/Stop Quantity Notes   Alcohol Current every day --/-- --  drinks alcohol, 8-14 drinks per week   Exercises regularly --  --/-- --  occasionally   Recreational Drug Use Never --/-- --  never used   Tobacco Current every day --/-- --  currently uses other tobacco products   Uses seatbelts --  --/-- --  yes         Review of Systems  · Constitutional  o Admits  o : fatigue  o Denies  o : fever, chills  · Eyes  o Admits  o : changes in vision  · HENT  o Admits  o : headaches  · Cardiovascular  o Denies  o : chest pain, dyspnea on exertion, lower extremity edema, palpitations  · Respiratory  o Denies  o : shortness of breath  · Gastrointestinal  o Admits  o : reflux  o Denies  o : nausea, vomiting, diarrhea, constipation  · Integument  o Denies  o : pigmentation changes  · Neurologic  o Admits  o : tingling or numbness  · Psychiatric  o Admits  o : anxiety, difficulty  "sleeping  o Denies  o : depression, suicidal ideation, homicidal ideation      Vitals  Date Time BP Position Site L\R Cuff Size HR RR TEMP (F) WT  HT  BMI kg/m2 BSA m2 O2 Sat HC       08/23/2019 11:11 /100 Sitting    90 - R  97.4 167lbs 6oz 5'  7\" 26.21 1.89 97 %    08/23/2019 12:11 /100 Sitting                     Physical Examination  · Constitutional  o Appearance  o : well developed, well-nourished, in no acute distress  · Eyes  o Conjunctivae  o : conjunctivae normal, no exudates present  · Neck  o Inspection/Palpation  o : normal appearance, no masses or tenderness, trachea midline  o Thyroid  o : no thyromegaly  · Respiratory  o Respiratory Effort  o : breathing unlabored  o Auscultation of Lungs  o : clear to ascultation  · Cardiovascular  o Heart  o :   § Auscultation of Heart  § : regular rate, normal rhythm, no murmurs present  o Peripheral Vascular System  o :   § Carotid Arteries  § : normal pulses bilaterally, no bruits present  § Pedal Pulses  § : bilateral pulse strength 2+  § Extremities  § : no edema  · Lymphatic  o Neck  o : no lymphadenopathy present  · Musculoskeletal  o General  o :   § General Musculoskeletal  § : Muscle tone, strength, and development grossly normal.  · Skin and Subcutaneous Tissue  o General Inspection  o : no lesions present, no areas of discoloration, skin turgor normal, texture normal  · Neurologic  o Gait and Station  o :   § Gait Screening  § : normal gait  · Psychiatric  o Mood and Affect  o : anxiety, affect appropriate          Assessment  · Anxiety disorder     300.00/F41.9  · Essential hypertension     401.9/I10  · Fatigue     780.79/R53.83  · Elevated CK     790.5/R74.8      Plan  · Orders  o Vitamin D (25-Hydroxy) Level (29783) - 300.00/F41.9, 780.79/R53.83 - 08/23/2019  o ACO-17: Screened for tobacco use AND received tobacco cessation intervention (4004F) - - 08/23/2019  o ACO-39: Current medications updated and reviewed () - - 08/23/2019  o CPK " "Total HMH (06069) - 790.5/R74.8 - 08/23/2019  o Thyroid function panel (32432, 61037) - 300.00/F41.9, 780.79/R53.83 - 08/23/2019  o Iron Profile (Iron 78210 TIBC 44987 and Transferrin 61612) (IRONP) - 300.00/F41.9, 780.79/R53.83 - 08/23/2019  o Ferritin ser/plas (67935) - 300.00/F41.9, 780.79/R53.83 - 08/23/2019  o B12 Folate levels (B12FO) - 300.00/F41.9, 780.79/R53.83 - 08/23/2019  · Medications  o sertraline 50 mg oral tablet   SIG: take 1 tablet (50 mg) by oral route once daily   DISP: (30) tablets with 0 refills  Prescribed on 08/23/2019     o lisinopril 10 mg oral tablet   SIG: take 1 tablet (10 mg) by oral route once daily   DISP: (30) tablets with 0 refills  Prescribed on 08/23/2019     o desvenlafaxine succinate 50 mg oral tablet extended release 24 hr   SIG: take 1 tablet (50 mg) by oral route once daily   DISP: (14) tablets with 0 refills  Adjusted on 08/23/2019     o buspirone 10 mg oral tablet   SIG: take 1 tablet (10 mg) by oral route 2 times per day   DISP: (60) tablets with 0 refills  Refilled on 08/23/2019     · Instructions  o Discussed the need for therapy, either with a certified counselor, psychologist, and/or family . If no improvement is noted or worsening of their condition, return to office or ER. But also discussed with patient that if they are non-responsive to the type of medication they may need to see a psychaitrist for further evaluation and management.   o Patient was given an SSRI/SSNRI medication and warned of possible side effects of the medication including potential for increase risk of sucicidal thoughts and feelings. Patient was instructed that if they begin to exhibit any of these effects they will discontinue the medication immediately and contact our office or the ER ASAP.   o Patient agrees to a \"No Self Harm\" contract. Patient will either call us, 911, ER, [facility], [facility].  o Take all medications as " prescribed/directed.  o Patient was educated/instructed on their diagnosis, treatment and medications prior to discharge from the clinic today. Wean Pristiq while starting Zoloft -- Buspar BID -- trial of Lisinopril -- I suspect his anxiety is contributing to the blood pressure, but he is symptomatic. Follow up with me in 2 weeks, sooner if needed.   o Chronic conditions reviewed and taken into consideration for today's treatment plan.  · Disposition  o Call or Return if symptoms worsen or persist.            Electronically Signed by: HERLINDA Carranza -Author on August 23, 2019 01:02:00 PM

## 2021-05-09 VITALS
SYSTOLIC BLOOD PRESSURE: 160 MMHG | HEART RATE: 106 BPM | DIASTOLIC BLOOD PRESSURE: 90 MMHG | WEIGHT: 175 LBS | OXYGEN SATURATION: 98 %

## 2021-05-09 VITALS
WEIGHT: 161 LBS | BODY MASS INDEX: 25.27 KG/M2 | HEART RATE: 89 BPM | HEIGHT: 67 IN | SYSTOLIC BLOOD PRESSURE: 120 MMHG | TEMPERATURE: 97.7 F | DIASTOLIC BLOOD PRESSURE: 80 MMHG | OXYGEN SATURATION: 97 %

## 2021-05-09 VITALS
HEIGHT: 67 IN | SYSTOLIC BLOOD PRESSURE: 140 MMHG | HEART RATE: 97 BPM | OXYGEN SATURATION: 97 % | TEMPERATURE: 98 F | DIASTOLIC BLOOD PRESSURE: 90 MMHG | WEIGHT: 172 LBS | BODY MASS INDEX: 27 KG/M2

## 2021-05-09 VITALS
HEART RATE: 110 BPM | SYSTOLIC BLOOD PRESSURE: 124 MMHG | OXYGEN SATURATION: 97 % | DIASTOLIC BLOOD PRESSURE: 84 MMHG | WEIGHT: 178 LBS

## 2021-05-09 VITALS
HEART RATE: 96 BPM | OXYGEN SATURATION: 99 % | WEIGHT: 168 LBS | SYSTOLIC BLOOD PRESSURE: 134 MMHG | DIASTOLIC BLOOD PRESSURE: 84 MMHG

## 2021-05-09 VITALS
WEIGHT: 167.37 LBS | SYSTOLIC BLOOD PRESSURE: 150 MMHG | TEMPERATURE: 97.4 F | OXYGEN SATURATION: 97 % | DIASTOLIC BLOOD PRESSURE: 100 MMHG | BODY MASS INDEX: 26.27 KG/M2 | HEIGHT: 67 IN | DIASTOLIC BLOOD PRESSURE: 100 MMHG | HEART RATE: 90 BPM | SYSTOLIC BLOOD PRESSURE: 140 MMHG

## 2021-05-15 VITALS — BODY MASS INDEX: 26.68 KG/M2 | WEIGHT: 170 LBS | RESPIRATION RATE: 14 BRPM | HEIGHT: 67 IN

## 2021-05-15 VITALS — HEIGHT: 67 IN | RESPIRATION RATE: 14 BRPM | BODY MASS INDEX: 26.68 KG/M2 | WEIGHT: 170 LBS

## 2021-05-16 VITALS
OXYGEN SATURATION: 98 % | WEIGHT: 190 LBS | TEMPERATURE: 98.6 F | DIASTOLIC BLOOD PRESSURE: 70 MMHG | HEIGHT: 68 IN | HEART RATE: 72 BPM | SYSTOLIC BLOOD PRESSURE: 132 MMHG | BODY MASS INDEX: 28.79 KG/M2

## 2021-05-16 VITALS — BODY MASS INDEX: 28.64 KG/M2 | WEIGHT: 189 LBS | HEIGHT: 68 IN | HEART RATE: 76 BPM

## 2021-05-16 VITALS
WEIGHT: 181.5 LBS | DIASTOLIC BLOOD PRESSURE: 78 MMHG | BODY MASS INDEX: 27.51 KG/M2 | HEART RATE: 74 BPM | HEIGHT: 68 IN | RESPIRATION RATE: 16 BRPM | OXYGEN SATURATION: 98 % | SYSTOLIC BLOOD PRESSURE: 118 MMHG | TEMPERATURE: 98.1 F

## 2021-05-16 VITALS
TEMPERATURE: 97.8 F | DIASTOLIC BLOOD PRESSURE: 80 MMHG | HEART RATE: 84 BPM | OXYGEN SATURATION: 98 % | RESPIRATION RATE: 12 BRPM | WEIGHT: 192.37 LBS | SYSTOLIC BLOOD PRESSURE: 118 MMHG

## 2021-05-16 VITALS
BODY MASS INDEX: 29.42 KG/M2 | OXYGEN SATURATION: 99 % | HEIGHT: 68 IN | HEART RATE: 76 BPM | SYSTOLIC BLOOD PRESSURE: 140 MMHG | TEMPERATURE: 96.8 F | DIASTOLIC BLOOD PRESSURE: 98 MMHG | RESPIRATION RATE: 16 BRPM | WEIGHT: 194.12 LBS

## 2021-05-16 VITALS
SYSTOLIC BLOOD PRESSURE: 135 MMHG | WEIGHT: 187 LBS | DIASTOLIC BLOOD PRESSURE: 89 MMHG | BODY MASS INDEX: 28.34 KG/M2 | HEIGHT: 68 IN | HEART RATE: 68 BPM

## 2021-05-16 VITALS
OXYGEN SATURATION: 98 % | TEMPERATURE: 97.6 F | SYSTOLIC BLOOD PRESSURE: 126 MMHG | HEIGHT: 68 IN | BODY MASS INDEX: 29.4 KG/M2 | DIASTOLIC BLOOD PRESSURE: 76 MMHG | WEIGHT: 194 LBS | RESPIRATION RATE: 14 BRPM | HEART RATE: 88 BPM

## 2021-07-01 ENCOUNTER — OFFICE VISIT (OUTPATIENT)
Dept: FAMILY MEDICINE CLINIC | Facility: CLINIC | Age: 36
End: 2021-07-01

## 2021-07-01 VITALS
OXYGEN SATURATION: 99 % | HEIGHT: 67 IN | SYSTOLIC BLOOD PRESSURE: 136 MMHG | HEART RATE: 101 BPM | WEIGHT: 171 LBS | TEMPERATURE: 97.8 F | BODY MASS INDEX: 26.84 KG/M2 | DIASTOLIC BLOOD PRESSURE: 88 MMHG

## 2021-07-01 DIAGNOSIS — R53.83 FATIGUE, UNSPECIFIED TYPE: ICD-10-CM

## 2021-07-01 DIAGNOSIS — R09.81 SINUS CONGESTION: Primary | ICD-10-CM

## 2021-07-01 PROCEDURE — 99213 OFFICE O/P EST LOW 20 MIN: CPT | Performed by: NURSE PRACTITIONER

## 2021-07-01 PROCEDURE — 87635 SARS-COV-2 COVID-19 AMP PRB: CPT | Performed by: NURSE PRACTITIONER

## 2021-07-01 RX ORDER — AMOXICILLIN 875 MG/1
1 TABLET, COATED ORAL 2 TIMES DAILY
COMMUNITY
Start: 2021-06-30 | End: 2022-01-11

## 2021-07-01 NOTE — PROGRESS NOTES
Chief Complaint  Headache, Fatigue, and Generalized Body Aches    Subjective            Devendra Rodriguez presents to [facility]  Patient reports acute onset of headache, fatigue, and body aches over the past 2 days.  He also describes sinus pressure and rhinorrhea.  He does have a dry cough, but denies any shortness of breath or wheezing.  He denies any nausea, vomiting, abdominal pain, or diarrhea.  No rash.    His employer is requesting that he get Covid tested.    PHQ-2 Total Score: 0      Past Medical History:   Diagnosis Date   • Anxiety    • Benign essential hypertension    • GERD (gastroesophageal reflux disease)        No Known Allergies     Past Surgical History:   Procedure Laterality Date   • ENDOSCOPY  01/23/2017    DR. ROBERTS        Social History     Tobacco Use   • Smoking status: Never Smoker   • Smokeless tobacco: Current User     Types: Snuff   Vaping Use   • Vaping Use: Never used   Substance Use Topics   • Alcohol use: Yes     Comment: DRINKS 8-14 DRINKS PER WEEK   • Drug use: Never       History reviewed. No pertinent family history.     Health Maintenance Due   Topic Date Due   • ANNUAL PHYSICAL  Never done   • Pneumococcal Vaccine 0-64 (1 of 1 - PPSV23) Never done   • COVID-19 Vaccine (1) Never done   • TDAP/TD VACCINES (2 - Tdap) 07/12/2010   • HEPATITIS C SCREENING  Never done        Current Outpatient Medications on File Prior to Visit   Medication Sig   • amoxicillin (AMOXIL) 875 MG tablet Take 1 tablet by mouth 2 (two) times a day.     No current facility-administered medications on file prior to visit.         There is no immunization history on file for this patient.    Review of Systems   Constitutional: Positive for chills and fatigue. Negative for fever.   HENT: Positive for rhinorrhea and sinus pressure. Negative for ear discharge, ear pain, postnasal drip, sore throat and swollen glands.    Eyes: Negative for discharge.   Respiratory: Positive  "for cough. Negative for chest tightness, shortness of breath and wheezing.    Cardiovascular: Negative for chest pain and palpitations.   Gastrointestinal: Negative for abdominal pain, diarrhea, nausea and vomiting.   Neurological: Positive for headache. Negative for dizziness and light-headedness.        Objective     /88   Pulse 101   Temp 97.8 °F (36.6 °C)   Ht 170.2 cm (67\")   Wt 77.6 kg (171 lb)   SpO2 99%   BMI 26.78 kg/m²       Physical Exam  Vitals reviewed.   Constitutional:       General: He is not in acute distress.     Appearance: Normal appearance. He is well-developed.   HENT:      Head: Normocephalic and atraumatic.      Right Ear: Tympanic membrane, ear canal and external ear normal. There is no impacted cerumen.      Left Ear: Tympanic membrane, ear canal and external ear normal. There is no impacted cerumen.      Nose: Congestion present.      Mouth/Throat:      Mouth: Mucous membranes are moist.      Pharynx: Oropharynx is clear. No oropharyngeal exudate or posterior oropharyngeal erythema.   Eyes:      General: No scleral icterus.     Conjunctiva/sclera: Conjunctivae normal.      Pupils: Pupils are equal, round, and reactive to light.   Neck:      Thyroid: No thyroid mass, thyromegaly or thyroid tenderness.      Trachea: Trachea normal.   Cardiovascular:      Rate and Rhythm: Normal rate and regular rhythm.      Pulses: Normal pulses.      Heart sounds: No murmur heard.     Pulmonary:      Effort: Pulmonary effort is normal.      Breath sounds: Normal breath sounds. No wheezing or rhonchi.   Musculoskeletal:         General: Normal range of motion.      Cervical back: Normal range of motion and neck supple.      Right lower leg: No edema.      Left lower leg: No edema.   Lymphadenopathy:      Cervical: No cervical adenopathy.   Skin:     General: Skin is warm and dry.   Neurological:      Mental Status: He is alert and oriented to person, place, and time.   Psychiatric:         Mood " and Affect: Mood and affect normal.         Behavior: Behavior normal.         Thought Content: Thought content normal.         Judgment: Judgment normal.         Result Review :                           Assessment and Plan      Diagnoses and all orders for this visit:    1. Sinus congestion (Primary)    2. Fatigue, unspecified type            Follow Up     Return if symptoms worsen or fail to improve.     We will test for Covid and call results.  Further treatment pending outcome of testing.  For now symptomatic treatment with over-the-counter antihistamines/nasal decongestants.    Patient was given instructions and counseling regarding his condition or for health maintenance advice. Please see specific information pulled into the AVS if appropriate.

## 2021-07-02 LAB — SARS-COV-2 N GENE RESP QL NAA+PROBE: NOT DETECTED

## 2021-09-03 DIAGNOSIS — F41.9 ANXIETY: Primary | ICD-10-CM

## 2021-09-03 DIAGNOSIS — R41.840 ATTENTION AND CONCENTRATION DEFICIT: ICD-10-CM

## 2021-09-03 DIAGNOSIS — Z62.819 HISTORY OF ABUSE IN CHILDHOOD: ICD-10-CM

## 2021-09-03 DIAGNOSIS — F33.1 MAJOR DEPRESSIVE DISORDER, RECURRENT, MODERATE (HCC): ICD-10-CM

## 2021-09-08 ENCOUNTER — TELEMEDICINE (OUTPATIENT)
Dept: PSYCHIATRY | Facility: CLINIC | Age: 36
End: 2021-09-08

## 2021-09-08 DIAGNOSIS — F51.05 INSOMNIA DUE TO MENTAL DISORDER: ICD-10-CM

## 2021-09-08 DIAGNOSIS — F33.1 MAJOR DEPRESSIVE DISORDER, RECURRENT EPISODE, MODERATE (HCC): Primary | ICD-10-CM

## 2021-09-08 DIAGNOSIS — F41.1 GENERALIZED ANXIETY DISORDER: ICD-10-CM

## 2021-09-08 DIAGNOSIS — R41.840 ATTENTION AND CONCENTRATION DEFICIT: ICD-10-CM

## 2021-09-08 DIAGNOSIS — F43.10 POST TRAUMATIC STRESS DISORDER (PTSD): ICD-10-CM

## 2021-09-08 PROBLEM — K21.9 GASTRIC REFLUX: Status: ACTIVE | Noted: 2021-09-08

## 2021-09-08 PROBLEM — M54.50 LOW BACK PAIN: Status: ACTIVE | Noted: 2018-03-15

## 2021-09-08 PROBLEM — T14.8XXA BROKEN BONES: Status: ACTIVE | Noted: 2021-09-08

## 2021-09-08 PROBLEM — K21.9 GASTROESOPHAGEAL REFLUX DISEASE: Status: ACTIVE | Noted: 2018-03-15

## 2021-09-08 PROBLEM — F32.A DEPRESSIVE DISORDER: Status: ACTIVE | Noted: 2018-03-15

## 2021-09-08 PROBLEM — M51.36 DEGENERATION OF LUMBAR INTERVERTEBRAL DISC: Status: ACTIVE | Noted: 2021-09-08

## 2021-09-08 PROBLEM — M51.369 DEGENERATION OF LUMBAR INTERVERTEBRAL DISC: Status: ACTIVE | Noted: 2021-09-08

## 2021-09-08 PROBLEM — F41.9 ANXIETY: Status: ACTIVE | Noted: 2018-03-15

## 2021-09-08 PROCEDURE — 90792 PSYCH DIAG EVAL W/MED SRVCS: CPT | Performed by: NURSE PRACTITIONER

## 2021-09-08 RX ORDER — BUPROPION HYDROCHLORIDE 150 MG/1
150 TABLET ORAL EVERY MORNING
Qty: 30 TABLET | Refills: 0 | Status: SHIPPED | OUTPATIENT
Start: 2021-09-08 | End: 2021-10-29
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
## 2021-10-12 ENCOUNTER — TELEMEDICINE (OUTPATIENT)
Dept: PSYCHIATRY | Facility: CLINIC | Age: 36
End: 2021-10-12

## 2021-10-12 DIAGNOSIS — F33.1 MAJOR DEPRESSIVE DISORDER, RECURRENT EPISODE, MODERATE (HCC): Primary | ICD-10-CM

## 2021-10-12 DIAGNOSIS — F41.1 GENERALIZED ANXIETY DISORDER: ICD-10-CM

## 2021-10-12 DIAGNOSIS — F51.05 INSOMNIA DUE TO MENTAL DISORDER: ICD-10-CM

## 2021-10-12 DIAGNOSIS — F90.0 ADHD (ATTENTION DEFICIT HYPERACTIVITY DISORDER), INATTENTIVE TYPE: ICD-10-CM

## 2021-10-12 DIAGNOSIS — F43.10 POST TRAUMATIC STRESS DISORDER (PTSD): ICD-10-CM

## 2021-10-12 PROCEDURE — 90833 PSYTX W PT W E/M 30 MIN: CPT | Performed by: NURSE PRACTITIONER

## 2021-10-12 PROCEDURE — 99214 OFFICE O/P EST MOD 30 MIN: CPT | Performed by: NURSE PRACTITIONER

## 2021-10-12 NOTE — PROGRESS NOTES
Subjective   Devendra Rodriguez is a 35 y.o. male who presents today for follow up. This provider is located at [address] [place], [place] [address], [address], [place] [address]. The Patient is seen remotely using [employer]. Patient is being seen via telehealth and confirm that they are in a secure environment for this session. The patient's condition being diagnosed/treated is appropriate for telemedicine. The provider identified himself as well as his credentials.   The patient gave consent to be seen remotely, and when consent is given they understand that the consent allows for patient identifiable information to be sent to a third party as needed.   They may refuse to be seen remotely at any time. The electronic data is encrypted and password protected, and the patient has been advised of the potential risks to privacy not withstanding such measures.     Virtual visit via Zoom audio and video due to the COVID-19 pandemic.  Patient is accepting of and agreeable to appointment.  The appointment consisted of the patient and I only.      Chief Complaint:  Depression, anxiety, attention issues    History of Present Illness: Patient reports an increase in stress over the past month, related to family work issues.  With increase in stress, patient reports continued symptoms of depression and anxiety.  Patient reports he has had less sadness but increase in irritability over the past month.  Energy has remained low.  No appetite changes. Continued issues with focus and concentration.  Has had trouble sleeping, often waking up feeling tired.  Denies hopelessness or suicidal thoughts.  Anxiety is been high, with excessive worries.  Denies physical symptoms of anxiety.    PTSD: Denies current symptoms of PTSD.     ADHD: Patient reports he started having issues with focus in 2014.  Endorses forgetfulness.  Difficulty following conversations.  Difficulty completing tasks.  Symptoms are present at home and at work.  No family history  of ADHD diagnosis, but states his dad and brother probably have ADHD.  Patient had ADHD testing on September 29, 2021 through Kaden Barajas, a licensed clinical psychologist.  Testing results found that the patient met criteria for adult attention deficit disorder with hyperactivity.  Symptoms included distractibility, organizational ability, short-term memory, avoidance, restlessness nest, high intensity and difficulties with executive functioning.  On the adult ADHD self-report scale symptom checklist, patient reported chronic difficulties with organizing tasks, remembering appointments or obligations, avoiding difficult task, continual fidgeting or squirming and feeling overly active.  No indications of drug-seeking behavior.  Acknowledged the presence of 22 of the 24 listed symptoms of ADHD in the DSM-V.    Psychiatric Review of Systems: Patient denies any current or previous hallucinations/delusions, paranoia, manic symptoms.     PHQ-9 Depression Screening  PHQ-9 Total Score:      Little interest or pleasure in doing things?     Feeling down, depressed, or hopeless?     Trouble falling or staying asleep, or sleeping too much?     Feeling tired or having little energy?     Poor appetite or overeating?     Feeling bad about yourself - or that you are a failure or have let yourself or your family down?     Trouble concentrating on things, such as reading the newspaper or watching television?     Moving or speaking so slowly that other people could have noticed? Or the opposite - being so fidgety or restless that you have been moving around a lot more than usual?     Thoughts that you would be better off dead, or of hurting yourself in some way?     PHQ-9 Total Score       WILLEM-7         Past Surgical History:  Past Surgical History:   Procedure Laterality Date   • ENDOSCOPY  01/23/2017    DR. ROBERTS       Problem List:  Patient Active Problem List   Diagnosis   • Anxiety   • Broken bones   • Degeneration of lumbar  intervertebral disc   • Depressive disorder   • Gastric reflux   • Gastroesophageal reflux disease   • Low back pain       Allergy:   No Known Allergies     Discontinued Medications:  There are no discontinued medications.    Current Medications:   Current Outpatient Medications   Medication Sig Dispense Refill   • amoxicillin (AMOXIL) 875 MG tablet Take 1 tablet by mouth 2 (two) times a day.     • buPROPion XL (Wellbutrin XL) 150 MG 24 hr tablet Take 1 tablet by mouth Every Morning for 30 days. 30 tablet 0     No current facility-administered medications for this visit.       Past Medical History:  Past Medical History:   Diagnosis Date   • Anxiety    • Benign essential hypertension    • GERD (gastroesophageal reflux disease)    • Head injury    • PTSD (post-traumatic stress disorder)    • Suicide attempt ([facility])        Past Psychiatric History:  Began Treatment: 2016  Diagnoses: Depression, anxiety, PTSD  Psychiatrist: Denies  Therapist: Reports seeing a therapist at [facility] on and off for 5 to 6 years, last seeing them around 2019.  Admission History: [facility] around 2016  Medication Trials: Multiple antidepressants, patient unable to identify medication names at this time.  Self Harm: Denies  Suicide Attempts: Denies    Substance Abuse History:   Types: Reports using cocaine from 2004 until 2006.  Patient reports using marijuana once or twice since 2006.  Withdrawal Symptoms: Denies  Longest Period Sober: Since 2006  AA: Denies    Social History:  Martial Status:  x1  Employed: Works at [employer] in [place] for the past 2 years  Kids: 2 children, ages 10 and 6  House: Lives by self   History: Denies    Social History     Socioeconomic History   • Marital status:    Tobacco Use   • Smoking status: Never Smoker   • Smokeless tobacco: Current User     Types: Snuff   Vaping Use   • Vaping Use: Never used   Substance and Sexual Activity   • Alcohol use: Yes      "Comment: DRINKS 8-14 DRINKS PER WEEK   • Drug use: Never   • Sexual activity: Yes       Family History:   Suicide Attempts: Denies  Suicide Completions: Denies      Family History   Problem Relation Age of Onset   • ADD / ADHD Brother    • Dementia Paternal Grandfather        Access to Firearms: Denies    Mental Status Exam:     Appearance: good eye contact, normal street clothes, groomed, sitting in chair   Behavior: pleasant and cooperative  Motor: no abnormal  Speech: normal rhythm, rate, volume, tone, not hyperverbal, not pressured, normal prosidy  Mood: \" Okay \"  Affect: euthymic  Thought Content: negative suicidal ideations, negative homicidal ideations, negative obsessions  Perceptions: negative auditory hallucinations, negative visual hallucinations, negative delusions, negative paranoia  Thought Process: goal directed, linear  Insight/Judgement: fair/fair  Cognition: grossly intact  Attention: intact  Orientation: person, place, time and situation  Memory: intact    Review of Systems:     Constitutional: Denies fatigue, night sweats  Eyes: Denies double vision, blurred vision  HENT: Denies vertigo, recent head injury  Cardiovascular: Denies chest pain, irregular heartbeats  Respiratory: Denies productive cough, shortness of breath  Gastrointestinal: Denies nausea, vomiting  Genitourinary: Denies dysuria, urinary retention  Integument: Denies hair growth change, new skin lesions  Neurologic: Denies altered mental status, seizures  Musculoskeletal: Denies joint swelling, limitation of motion  Endocrine: Denies cold intolerance, heat intolerance  Psychiatric: Admits anxiety, depression, attention issues. Denies hussain, post-traumatic stress disorder, obsessive compulsive disorder, psychosis.   Allergic-immunologic: Denies frequent illnesses      Vital Signs:   There were no vitals taken for this visit.     Lab Results:   Office Visit on 07/01/2021   Component Date Value Ref Range Status   • COVID19 07/01/2021 " Not Detected  Not Detected - Ref. Range Final       EKG Results:  No orders to display       Imaging Results:  No Images in the past 120 days found..      Assessment/Plan   Diagnoses and all orders for this visit:    1. Major depressive disorder, recurrent episode, moderate (HCC) (Primary)    2. Generalized anxiety disorder    3. Post traumatic stress disorder (PTSD)    4. ADHD (attention deficit hyperactivity disorder), inattentive type    5. Insomnia due to mental disorder      Patient presents today with continued symptoms of depression and anxiety, stemming from situational stressors.  We will continue bupropion to target depression and anxiety. Discussed adding medication to help with insomnia, but patient declines.     Patient had testing for ADHD performed on September 29, 2021 by Pedro Barajas, a licensed clinical psychologist.  Records of the testing were sent to this office and reviewed by me.  Per testing results, patient met criteria for adult attention deficit disorder with hyperactivity.  Will obtain urine drug screen and have patient sign controlled substance agreement form.  Plan to start patient on stimulant to target ADHD.    16 minutes of supportive psychotherapy with goal to strengthen defenses, promote problems solving, restore adaptive functioning and provide symptom relief. The therapeutic alliance was strengthened to encourage the patient to express their thoughts and feelings. Esteem building was enhanced through praise, reassurance, normalizing and encouragement. Coping skills were enhanced to build distress tolerance skills and emotional regulation. Patient given education on medication side effects, diagnosis/illness and relapse symptoms. Plan to continue supportive psychotherapy in next appointment to provide symptom relief.     1 month    Visit Diagnoses:    ICD-10-CM ICD-9-CM   1. Major depressive disorder, recurrent episode, moderate (HCC)  F33.1 296.32   2. Generalized anxiety  disorder  F41.1 300.02   3. Post traumatic stress disorder (PTSD)  F43.10 309.81   4. ADHD (attention deficit hyperactivity disorder), inattentive type  F90.0 314.00   5. Insomnia due to mental disorder  F51.05 300.9     327.02       PLAN:  1. Safety: No acute safety concerns.   2. Referral: N/a  3. Risk Assessment: Risk of self-harm acutely is moderate.  Risk factors include anxiety disorder, mood disorder, and recent psychosocial stressors (pandemic). Protective factors include no family history, denies access to guns/weapons, no present SI, no history of suicide attempts or self-harm in the past, minimal AODA, healthcare seeking, future orientation, willingness to engage in care.  Risk of self-harm chronically is also moderate, but could be further elevated in the event of treatment noncompliance and/or AODA.  4. Medications: CONTINUE bupropion  mg p.o. daily to target depression, anxiety and attention issues. Risks, benefits, alternatives discussed with patient including nausea, GI upset, increased energy, exacerbation of irritability, insomnia, lowering of seizure threshold.  After discussion of these risks and benefits, the patient voiced understanding and agreed to proceed.  5. Labs/studies: UDS  6. Follow-up: 1 month    Patient screened positive for depression based on a PHQ-9 score of 0 on 7/1/2021. Follow-up recommendations include: Suicide Risk Assessment performed.         TREATMENT PLAN/GOALS: Continue supportive psychotherapy efforts and medications as indicated. Treatment and medication options discussed during today's visit. Patient ackowledged and verbally consented to continue with current treatment plan and was educated on the importance of compliance with treatment and follow-up appointments.      MEDICATION ISSUES:  SHOSHANA reviewed as expected.  Discussed medication options and treatment plan of prescribed medication as well as the risks, benefits, and side effects including potential  falls, possible impaired driving and metabolic adversities among others. Patient is agreeable to call the office with any worsening of symptoms or onset of side effects. Patient is agreeable to call 911 or go to the nearest ER should he/she begin having SI/HI. No medication side effects or related complaints today.     MEDS ORDERED DURING VISIT:  No orders of the defined types were placed in this encounter.    No medication refills needed at this time    Return in about 1 month (around 11/12/2021) for Next scheduled follow up.         This document has been electronically signed by HERLINDA Webster  October 12, 2021 11:09 EDT      Part of this note may be an electronic transcription/translation of spoken language to printed text using the Dragon Dictation System.

## 2021-10-25 ENCOUNTER — TELEPHONE (OUTPATIENT)
Dept: PSYCHIATRY | Facility: CLINIC | Age: 36
End: 2021-10-25

## 2021-10-25 DIAGNOSIS — F90.0 ADHD (ATTENTION DEFICIT HYPERACTIVITY DISORDER), INATTENTIVE TYPE: Primary | ICD-10-CM

## 2021-10-25 RX ORDER — DEXTROAMPHETAMINE SACCHARATE, AMPHETAMINE ASPARTATE MONOHYDRATE, DEXTROAMPHETAMINE SULFATE AND AMPHETAMINE SULFATE 2.5; 2.5; 2.5; 2.5 MG/1; MG/1; MG/1; MG/1
10 CAPSULE, EXTENDED RELEASE ORAL DAILY
Qty: 30 CAPSULE | Refills: 0 | Status: SHIPPED | OUTPATIENT
Start: 2021-10-25 | End: 2021-11-24

## 2021-10-25 NOTE — TELEPHONE ENCOUNTER
Pt said that he drop his UDS results off and would like meds to be sent to [facility] in [place]

## 2021-10-29 ENCOUNTER — TELEMEDICINE (OUTPATIENT)
Dept: PSYCHIATRY | Facility: CLINIC | Age: 36
End: 2021-10-29

## 2021-10-29 DIAGNOSIS — F33.1 MAJOR DEPRESSIVE DISORDER, RECURRENT EPISODE, MODERATE (HCC): Primary | ICD-10-CM

## 2021-10-29 DIAGNOSIS — F41.1 GENERALIZED ANXIETY DISORDER: ICD-10-CM

## 2021-10-29 DIAGNOSIS — F51.05 INSOMNIA DUE TO MENTAL DISORDER: ICD-10-CM

## 2021-10-29 DIAGNOSIS — F43.10 POST TRAUMATIC STRESS DISORDER (PTSD): ICD-10-CM

## 2021-10-29 DIAGNOSIS — F90.0 ADHD (ATTENTION DEFICIT HYPERACTIVITY DISORDER), INATTENTIVE TYPE: ICD-10-CM

## 2021-10-29 PROCEDURE — 99214 OFFICE O/P EST MOD 30 MIN: CPT | Performed by: NURSE PRACTITIONER

## 2021-10-29 PROCEDURE — 90833 PSYTX W PT W E/M 30 MIN: CPT | Performed by: NURSE PRACTITIONER

## 2021-10-29 NOTE — PROGRESS NOTES
Subjective   Devendra Rodriguez is a 35 y.o. male who presents today for follow up. Mode of visit: Video  Location of provider: [address], [place] [address].  Location of patient: Home  Does the patient consent to use a video/audio connection for your medical care today? Yes  The visit included audio and video interaction. No technical issues occurred during this visit.        Chief Complaint:  Depression, anxiety, attention issues    History of Present Illness: Depression over the past month- stressed out related to relationship stress  Sleep- difficulty  Interest- Denies  Guilt- Denies  Energy- energy low  Concentration- difficulty to manage  Appetite- Denies  Psychomotor retardation/agitation- Denies  Suicidal thoughts or hopelessness- Denies    Anxiety over the past month- anxiety has been high over the past month  Anxious, nervous or worried on most days about a number of events or activities- excessive worries  No control over worries- difficult to manage  Irritability- increased irritability  Concentration- Continued difficulty with concentration  Sleep- difficulty  Restlessness- endorses  Tension in muscles- endoreses    PTSD: Denies current symptoms of PTSD.     10/12/21 ADHD: Patient reports he started having issues with focus in 2014.  Endorses forgetfulness.  Difficulty following conversations.  Difficulty completing tasks.  Symptoms are present at home and at work.  No family history of ADHD diagnosis, but states his dad and brother probably have ADHD.  Patient had ADHD testing on September 29, 2021 through Kaden Barajas, a licensed clinical psychologist.  Testing results found that the patient met criteria for adult attention deficit disorder with hyperactivity.  Symptoms included distractibility, organizational ability, short-term memory, avoidance, restlessness nest, high intensity and difficulties with executive functioning.  On the adult ADHD self-report scale symptom  checklist, patient reported chronic difficulties with organizing tasks, remembering appointments or obligations, avoiding difficult task, continual fidgeting or squirming and feeling overly active.  No indications of drug-seeking behavior.  Acknowledged the presence of 22 of the 24 listed symptoms of ADHD in the DSM-V.    Psychiatric Review of Systems: Patient denies any current or previous hallucinations/delusions, paranoia, manic symptoms.     PHQ-9 Depression Screening  PHQ-9 Total Score:      Little interest or pleasure in doing things?     Feeling down, depressed, or hopeless?     Trouble falling or staying asleep, or sleeping too much?     Feeling tired or having little energy?     Poor appetite or overeating?     Feeling bad about yourself - or that you are a failure or have let yourself or your family down?     Trouble concentrating on things, such as reading the newspaper or watching television?     Moving or speaking so slowly that other people could have noticed? Or the opposite - being so fidgety or restless that you have been moving around a lot more than usual?     Thoughts that you would be better off dead, or of hurting yourself in some way?     PHQ-9 Total Score       WILLEM-7         Past Surgical History:  Past Surgical History:   Procedure Laterality Date   • ENDOSCOPY  01/23/2017    DR. ROBERTS       Problem List:  Patient Active Problem List   Diagnosis   • Anxiety   • Broken bones   • Degeneration of lumbar intervertebral disc   • Depressive disorder   • Gastric reflux   • Gastroesophageal reflux disease   • Low back pain       Allergy:   No Known Allergies     Discontinued Medications:  Medications Discontinued During This Encounter   Medication Reason   • buPROPion XL (Wellbutrin XL) 150 MG 24 hr tablet Historical Med - Therapy completed       Current Medications:   Current Outpatient Medications   Medication Sig Dispense Refill   • amoxicillin (AMOXIL) 875 MG tablet Take 1 tablet by mouth 2  (two) times a day.     • amphetamine-dextroamphetamine XR (Adderall XR) 10 MG 24 hr capsule Take 1 capsule by mouth Daily for 30 days 30 capsule 0     No current facility-administered medications for this visit.       Past Medical History:  Past Medical History:   Diagnosis Date   • Anxiety    • Benign essential hypertension    • GERD (gastroesophageal reflux disease)    • Head injury    • PTSD (post-traumatic stress disorder)    • Suicide attempt (HCC)        Past Psychiatric History:  Began Treatment: 2016  Diagnoses: Depression, anxiety, PTSD  Psychiatrist: Denies  Therapist: Reports seeing a therapist at [facility] on and off for 5 to 6 years, last seeing them around 2019.  Admission History: [facility] around 2016  Medication Trials: Multiple antidepressants, patient unable to identify medication names at this time.  Self Harm: Denies  Suicide Attempts: Denies    Substance Abuse History:   Types: Reports using cocaine from 2004 until 2006.  Patient reports using marijuana once or twice since 2006.  Withdrawal Symptoms: Denies  Longest Period Sober: Since 2006  AA: Denies    Social History:  Martial Status:  x1  Employed: Works at [employer] in [place] for the past 2 years  Kids: 2 children, ages 10 and 6  House: Lives by self   History: Denies    Social History     Socioeconomic History   • Marital status:    Tobacco Use   • Smoking status: Never Smoker   • Smokeless tobacco: Current User     Types: Snuff   Vaping Use   • Vaping Use: Never used   Substance and Sexual Activity   • Alcohol use: Yes     Comment: DRINKS 8-14 DRINKS PER WEEK   • Drug use: Never   • Sexual activity: Yes       Family History:   Suicide Attempts: Denies  Suicide Completions: Denies      Family History   Problem Relation Age of Onset   • ADD / ADHD Brother    • Dementia Paternal Grandfather        Access to Firearms: Denies    Mental Status Exam:     Appearance: good eye contact, normal  "street clothes, groomed, sitting in chair   Behavior: pleasant and cooperative  Motor: no abnormal  Speech: normal rhythm, rate, volume, tone, not hyperverbal, not pressured, normal prosidy  Mood: \" Okay \"  Affect: euthymic  Thought Content: negative suicidal ideations, negative homicidal ideations, negative obsessions  Perceptions: negative auditory hallucinations, negative visual hallucinations, negative delusions, negative paranoia  Thought Process: goal directed, linear  Insight/Judgement: fair/fair  Cognition: grossly intact  Attention: intact  Orientation: person, place, time and situation  Memory: intact    Review of Systems:     Constitutional: Denies fatigue, night sweats  Eyes: Denies double vision, blurred vision  HENT: Denies vertigo, recent head injury  Cardiovascular: Denies chest pain, irregular heartbeats  Respiratory: Denies productive cough, shortness of breath  Gastrointestinal: Denies nausea, vomiting  Genitourinary: Denies dysuria, urinary retention  Integument: Denies hair growth change, new skin lesions  Neurologic: Denies altered mental status, seizures  Musculoskeletal: Denies joint swelling, limitation of motion  Endocrine: Denies cold intolerance, heat intolerance  Psychiatric: Admits anxiety, depression, attention issues. Denies hussain, post-traumatic stress disorder, obsessive compulsive disorder, psychosis.   Allergic-immunologic: Denies frequent illnesses      Vital Signs:   There were no vitals taken for this visit.     Lab Results:   Office Visit on 07/01/2021   Component Date Value Ref Range Status   • COVID19 07/01/2021 Not Detected  Not Detected - Ref. Range Final       EKG Results:  No orders to display       Imaging Results:  No Images in the past 120 days found..      Assessment/Plan   Diagnoses and all orders for this visit:    1. Major depressive disorder, recurrent episode, moderate (HCC) (Primary)    2. Generalized anxiety disorder    3. ADHD (attention deficit hyperactivity " disorder), inattentive type    4. Post traumatic stress disorder (PTSD)    5. Insomnia due to mental disorder    Has not picked up adderall yet. Stopped wellbutrin because of no benefit to the medication. Discussed the possibility of adding medication to help with depression, anxiety and sleep, but patient declines at this time.    10/12/21 Patient had testing for ADHD performed on September 29, 2021 by Pedro Barajas, a licensed clinical psychologist.  Records of the testing were sent to this office and reviewed by me.  Per testing results, patient met criteria for adult attention deficit disorder with hyperactivity.      16 minutes of supportive psychotherapy with goal to strengthen defenses, promote problems solving, restore adaptive functioning and provide symptom relief. The therapeutic alliance was strengthened to encourage the patient to express their thoughts and feelings. Esteem building was enhanced through praise, reassurance, normalizing and encouragement. Coping skills were enhanced to build distress tolerance skills and emotional regulation. Patient given education on medication side effects, diagnosis/illness and relapse symptoms. Plan to continue supportive psychotherapy in next appointment to provide symptom relief.     4 weeks    Visit Diagnoses:    ICD-10-CM ICD-9-CM   1. Major depressive disorder, recurrent episode, moderate (HCC)  F33.1 296.32   2. Generalized anxiety disorder  F41.1 300.02   3. ADHD (attention deficit hyperactivity disorder), inattentive type  F90.0 314.00   4. Post traumatic stress disorder (PTSD)  F43.10 309.81   5. Insomnia due to mental disorder  F51.05 300.9     327.02       PLAN:  1. Safety: No acute safety concerns.   2. Referral: N/a  3. Risk Assessment: Risk of self-harm acutely is moderate.  Risk factors include anxiety disorder, mood disorder, and recent psychosocial stressors (pandemic). Protective factors include no family history, denies access to guns/weapons, no  present SI, no history of suicide attempts or self-harm in the past, minimal AODA, healthcare seeking, future orientation, willingness to engage in care.  Risk of self-harm chronically is also moderate, but could be further elevated in the event of treatment noncompliance and/or AODA.  4. Medications: Patient stopped wellbutrin. CONTINUE adderall xr 10mg po qday to target ADHD symptoms. Risks, benefits, side effects discussed with patient including elevated heart rate, elevated blood pressure, irritability, insomnia, sexual dysfunction, appetite suppressing properties, psychosis.  After discussion of these risks and benefits, the patient voiced understanding and agreed to proceed. Controlled substances consent verbally signed. UDS and Shoshana ordered.  5. Labs/studies: UDS 10-19-21 negative.  Follow-up: 4 weeks  Patient screened positive for depression based on a PHQ-9 score of 0 on 7/1/2021. Follow-up recommendations include: Suicide Risk Assessment performed.         TREATMENT PLAN/GOALS: Continue supportive psychotherapy efforts and medications as indicated. Treatment and medication options discussed during today's visit. Patient ackowledged and verbally consented to continue with current treatment plan and was educated on the importance of compliance with treatment and follow-up appointments.      MEDICATION ISSUES:  SHOSHANA reviewed as expected.  Discussed medication options and treatment plan of prescribed medication as well as the risks, benefits, and side effects including potential falls, possible impaired driving and metabolic adversities among others. Patient is agreeable to call the office with any worsening of symptoms or onset of side effects. Patient is agreeable to call 911 or go to the nearest ER should he/she begin having SI/HI. No medication side effects or related complaints today.     MEDS ORDERED DURING VISIT:  No orders of the defined types were placed in this encounter.    No medication refills  needed at this time    Return in about 4 weeks (around 11/26/2021) for Next scheduled follow up.         This document has been electronically signed by HERLINDA Webster  October 29, 2021 09:52 EDT      Part of this note may be an electronic transcription/translation of spoken language to printed text using the Dragon Dictation System.

## 2021-11-10 ENCOUNTER — TELEPHONE (OUTPATIENT)
Dept: PSYCHIATRY | Facility: CLINIC | Age: 36
End: 2021-11-10

## 2021-11-10 NOTE — TELEPHONE ENCOUNTER
Pt called and said that he has a question about his med and it pertain to pt work and would like for you to call him back ASAP

## 2021-11-12 NOTE — TELEPHONE ENCOUNTER
Pt called and said he has more questions about the medications prescribed to him and that the provider told him to call him back if he had any more questions

## 2021-12-09 ENCOUNTER — TELEMEDICINE (OUTPATIENT)
Dept: PSYCHIATRY | Facility: CLINIC | Age: 36
End: 2021-12-09

## 2021-12-09 DIAGNOSIS — F41.1 GENERALIZED ANXIETY DISORDER: ICD-10-CM

## 2021-12-09 DIAGNOSIS — F43.10 POST TRAUMATIC STRESS DISORDER (PTSD): ICD-10-CM

## 2021-12-09 DIAGNOSIS — F33.1 MAJOR DEPRESSIVE DISORDER, RECURRENT EPISODE, MODERATE (HCC): Primary | ICD-10-CM

## 2021-12-09 DIAGNOSIS — F90.0 ADHD (ATTENTION DEFICIT HYPERACTIVITY DISORDER), INATTENTIVE TYPE: ICD-10-CM

## 2021-12-09 PROCEDURE — 90833 PSYTX W PT W E/M 30 MIN: CPT | Performed by: NURSE PRACTITIONER

## 2021-12-09 PROCEDURE — 99214 OFFICE O/P EST MOD 30 MIN: CPT | Performed by: NURSE PRACTITIONER

## 2021-12-09 RX ORDER — AMOXICILLIN AND CLAVULANATE POTASSIUM 875; 125 MG/1; MG/1
1 TABLET, FILM COATED ORAL 2 TIMES DAILY
COMMUNITY
Start: 2021-11-05 | End: 2022-01-11

## 2021-12-09 RX ORDER — DEXTROAMPHETAMINE SACCHARATE, AMPHETAMINE ASPARTATE MONOHYDRATE, DEXTROAMPHETAMINE SULFATE AND AMPHETAMINE SULFATE 2.5; 2.5; 2.5; 2.5 MG/1; MG/1; MG/1; MG/1
10 CAPSULE, EXTENDED RELEASE ORAL DAILY
Qty: 30 CAPSULE | Refills: 0 | Status: SHIPPED | OUTPATIENT
Start: 2021-12-09 | End: 2022-01-08
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
## 2022-01-11 ENCOUNTER — TELEMEDICINE (OUTPATIENT)
Dept: PSYCHIATRY | Facility: CLINIC | Age: 37
End: 2022-01-11

## 2022-01-11 DIAGNOSIS — F51.05 INSOMNIA DUE TO MENTAL DISORDER: ICD-10-CM

## 2022-01-11 DIAGNOSIS — F90.0 ADHD (ATTENTION DEFICIT HYPERACTIVITY DISORDER), INATTENTIVE TYPE: ICD-10-CM

## 2022-01-11 DIAGNOSIS — F43.10 POST TRAUMATIC STRESS DISORDER (PTSD): ICD-10-CM

## 2022-01-11 DIAGNOSIS — F41.1 GENERALIZED ANXIETY DISORDER: ICD-10-CM

## 2022-01-11 DIAGNOSIS — F33.1 MAJOR DEPRESSIVE DISORDER, RECURRENT EPISODE, MODERATE: Primary | ICD-10-CM

## 2022-01-11 PROCEDURE — 99214 OFFICE O/P EST MOD 30 MIN: CPT | Performed by: NURSE PRACTITIONER

## 2022-01-11 RX ORDER — DEXTROAMPHETAMINE SACCHARATE, AMPHETAMINE ASPARTATE MONOHYDRATE, DEXTROAMPHETAMINE SULFATE AND AMPHETAMINE SULFATE 5; 5; 5; 5 MG/1; MG/1; MG/1; MG/1
20 CAPSULE, EXTENDED RELEASE ORAL EVERY MORNING
Qty: 30 CAPSULE | Refills: 0 | Status: SHIPPED | OUTPATIENT
Start: 2022-01-11 | End: 2022-02-10

## 2022-01-11 RX ORDER — DEXTROAMPHETAMINE SACCHARATE, AMPHETAMINE ASPARTATE MONOHYDRATE, DEXTROAMPHETAMINE SULFATE AND AMPHETAMINE SULFATE 2.5; 2.5; 2.5; 2.5 MG/1; MG/1; MG/1; MG/1
10 CAPSULE, EXTENDED RELEASE ORAL EVERY MORNING
COMMUNITY
End: 2022-01-11 | Stop reason: SDUPTHER

## 2022-01-11 NOTE — PROGRESS NOTES
Subjective   Devendra Rodriguez is a 36 y.o. male who presents today for follow up. Mode of visit: Video  Location of provider: [address], [place] [address].  Location of patient: Home  Does the patient consent to use a video/audio connection for your medical care today? Yes  The visit included audio and video interaction. No technical issues occurred during this visit.        Chief Complaint:  Depression, anxiety, attention issues    History of Present Illness: Depression over the past month- has been about the same  Sleep- good  Interest- Denies anhedonia  Guilt- Denies  Energy- low at times  Concentration- denies  Appetite- good  Psychomotor retardation/agitation- Denies  Suicidal thoughts or hopelessness- denies hopelessness, si or hi.     Anxiety over the past month- has been high   Anxious, nervous or worried on most days about a number of events or activities- excessive worries  No control over worries- difficult to manage at times  Irritability- denies  Concentration- see above  Sleep- see above  Restlessness- denies  Tension in muscles- endorses    PTSD: Denies current symptoms of PTSD.     ADHD: improvement in focus during the morning, feels it wearing off later in the day. Able to complete tasks better at times. Difficulty staying on task for long periods of time.     10/12/21 ADHD: Patient reports he started having issues with focus in 2014.  Endorses forgetfulness.  Difficulty following conversations.  Difficulty completing tasks.  Symptoms are present at home and at work.  No family history of ADHD diagnosis, but states his dad and brother probably have ADHD.  Patient had ADHD testing on September 29, 2021 through Kaden Barajas, a licensed clinical psychologist.  Testing results found that the patient met criteria for adult attention deficit disorder with hyperactivity.  Symptoms included distractibility, organizational ability, short-term memory, avoidance, restlessness  nest, high intensity and difficulties with executive functioning.  On the adult ADHD self-report scale symptom checklist, patient reported chronic difficulties with organizing tasks, remembering appointments or obligations, avoiding difficult task, continual fidgeting or squirming and feeling overly active.  No indications of drug-seeking behavior.  Acknowledged the presence of 22 of the 24 listed symptoms of ADHD in the DSM-V.    Psychiatric Review of Systems: Patient denies any current or previous hallucinations/delusions, paranoia, manic symptoms.     PHQ-9 Depression Screening  PHQ-9 Total Score: 16    Little interest or pleasure in doing things? 3   Feeling down, depressed, or hopeless? 2   Trouble falling or staying asleep, or sleeping too much? 3   Feeling tired or having little energy? 3   Poor appetite or overeating? 0   Feeling bad about yourself - or that you are a failure or have let yourself or your family down? 0   Trouble concentrating on things, such as reading the newspaper or watching television? 3   Moving or speaking so slowly that other people could have noticed? Or the opposite - being so fidgety or restless that you have been moving around a lot more than usual? 2   Thoughts that you would be better off dead, or of hurting yourself in some way? 0   PHQ-9 Total Score 16     WILLEM-7  Feeling nervous, anxious or on edge: Nearly every day  Not being able to stop or control worrying: Nearly every day  Worrying too much about different things: Nearly every day  Trouble Relaxing: More than half the days  Being so restless that it is hard to sit still: More than half the days  Feeling afraid as if something awful might happen: Not at all  Becoming easily annoyed or irritable: More than half the days  WILLEM 7 Total Score: 15  If you checked any problems, how difficult have these problems made it for you to do your work, take care of things at home, or get along with other people: Very difficult      Past  Surgical History:  Past Surgical History:   Procedure Laterality Date   • ENDOSCOPY  01/23/2017    DR. ROBERTS       Problem List:  Patient Active Problem List   Diagnosis   • Anxiety   • Broken bones   • Degeneration of lumbar intervertebral disc   • Depressive disorder   • Gastric reflux   • Gastroesophageal reflux disease   • Low back pain       Allergy:   No Known Allergies     Discontinued Medications:  Medications Discontinued During This Encounter   Medication Reason   • amoxicillin (AMOXIL) 875 MG tablet *Therapy completed   • amoxicillin-clavulanate (AUGMENTIN) 875-125 MG per tablet *Therapy completed   • amphetamine-dextroamphetamine XR (Adderall XR) 10 MG 24 hr capsule Duplicate order       Current Medications:   Current Outpatient Medications   Medication Sig Dispense Refill   • amphetamine-dextroamphetamine XR (Adderall XR) 20 MG 24 hr capsule Take 1 capsule by mouth Every Morning for 30 days 30 capsule 0     No current facility-administered medications for this visit.       Past Medical History:  Past Medical History:   Diagnosis Date   • Anxiety    • Benign essential hypertension    • GERD (gastroesophageal reflux disease)    • Head injury    • PTSD (post-traumatic stress disorder)    • Suicide attempt (HCC)        Past Psychiatric History:  Began Treatment: 2016  Diagnoses: Depression, anxiety, PTSD  Psychiatrist: Denies  Therapist: Reports seeing a therapist at [facility] on and off for 5 to 6 years, last seeing them around 2019.  Admission History: [facility] around 2016  Medication Trials: Multiple antidepressants, patient unable to identify medication names at this time.  Self Harm: Denies  Suicide Attempts: Denies    Substance Abuse History:   Types: Reports using cocaine from 2004 until 2006.  Patient reports using marijuana once or twice since 2006.  Withdrawal Symptoms: Denies  Longest Period Sober: Since 2006  AA: Denies    Social History:  Martial Status:  x1  Employed:  "Works at [employer] in [place] for the past 2 years  Kids: 2 children, ages 10 and 6  House: Lives by self   History: Denies    Social History     Socioeconomic History   • Marital status:    Tobacco Use   • Smoking status: Never Smoker   • Smokeless tobacco: Current User     Types: Snuff   Vaping Use   • Vaping Use: Never used   Substance and Sexual Activity   • Alcohol use: Yes     Comment: DRINKS 8-14 DRINKS PER WEEK   • Drug use: Never   • Sexual activity: Yes       Family History:   Suicide Attempts: Denies  Suicide Completions: Denies      Family History   Problem Relation Age of Onset   • Dementia Paternal Grandfather        Access to Firearms: Denies    Mental Status Exam:     Appearance: good eye contact, normal street clothes, groomed, sitting in chair   Behavior: pleasant and cooperative  Motor: no abnormal  Speech: normal rhythm, rate, volume, tone, not hyperverbal, not pressured, normal prosidy  Mood: \" Okay \"  Affect: euthymic  Thought Content: negative suicidal ideations, negative homicidal ideations, negative obsessions  Perceptions: negative auditory hallucinations, negative visual hallucinations, negative delusions, negative paranoia  Thought Process: goal directed, linear  Insight/Judgement: fair/fair  Cognition: grossly intact  Attention: intact  Orientation: person, place, time and situation  Memory: intact    Review of Systems:     Constitutional: Denies fatigue, night sweats  Eyes: Denies double vision, blurred vision  HENT: Denies vertigo, recent head injury  Cardiovascular: Denies chest pain, irregular heartbeats  Respiratory: Denies productive cough, shortness of breath  Gastrointestinal: Denies nausea, vomiting  Genitourinary: Denies dysuria, urinary retention  Integument: Denies hair growth change, new skin lesions  Neurologic: Denies altered mental status, seizures  Musculoskeletal: Denies joint swelling, limitation of motion  Endocrine: Denies cold " intolerance, heat intolerance  Psychiatric: Admits anxiety, depression, attention issues. Denies hussain, post-traumatic stress disorder, obsessive compulsive disorder, psychosis.   Allergic-immunologic: Denies frequent illnesses      Vital Signs:   There were no vitals taken for this visit.     Lab Results:   Office Visit on 07/01/2021   Component Date Value Ref Range Status   • COVID19 07/01/2021 Not Detected  Not Detected - Ref. Range Final       EKG Results:  No orders to display       Imaging Results:  No Images in the past 120 days found..      Assessment/Plan   Diagnoses and all orders for this visit:    1. Major depressive disorder, recurrent episode, moderate (HCC) (Primary)    2. Generalized anxiety disorder    3. ADHD (attention deficit hyperactivity disorder), inattentive type  -     amphetamine-dextroamphetamine XR (Adderall XR) 20 MG 24 hr capsule; Take 1 capsule by mouth Every Morning for 30 days  Dispense: 30 capsule; Refill: 0    4. Post traumatic stress disorder (PTSD)    5. Insomnia due to mental disorder     Increase adderall to target adhd symptoms. Has tolerated medication well with no side effects. Declines antidepressant at this time. 10 minutes of supportive psychotherapy with goal to strengthen defenses, promote problems solving, restore adaptive functioning and provide symptom relief. The therapeutic alliance was strengthened to encourage the patient to express their thoughts and feelings. Esteem building was enhanced through praise, reassurance, normalizing and encouragement. Coping skills were enhanced to build distress tolerance skills and emotional regulation. Patient given education on medication side effects, diagnosis/illness and relapse symptoms. Plan to continue supportive psychotherapy in next appointment to provide symptom relief. 1 month    10/12/21 Patient had testing for ADHD performed on September 29, 2021 by Pedro Barajas, a licensed clinical psychologist.  Records of the  testing were sent to this office and reviewed by me.  Per testing results, patient met criteria for adult attention deficit disorder with hyperactivity.      Visit Diagnoses:    ICD-10-CM ICD-9-CM   1. Major depressive disorder, recurrent episode, moderate (HCC)  F33.1 296.32   2. Generalized anxiety disorder  F41.1 300.02   3. ADHD (attention deficit hyperactivity disorder), inattentive type  F90.0 314.00   4. Post traumatic stress disorder (PTSD)  F43.10 309.81   5. Insomnia due to mental disorder  F51.05 300.9     327.02       PLAN:  1. Safety: No acute safety concerns.   2. Referral: N/a  3. Risk Assessment: Risk of self-harm acutely is moderate.  Risk factors include anxiety disorder, mood disorder, and recent psychosocial stressors (pandemic). Protective factors include no family history, denies access to guns/weapons, no present SI, no history of suicide attempts or self-harm in the past, minimal AODA, healthcare seeking, future orientation, willingness to engage in care.  Risk of self-harm chronically is also moderate, but could be further elevated in the event of treatment noncompliance and/or AODA.  4. Medications: INCREASE adderall xr 10mg to 20mg po qday to target ADHD symptoms. Risks, benefits, side effects discussed with patient including elevated heart rate, elevated blood pressure, irritability, insomnia, sexual dysfunction, appetite suppressing properties, psychosis.  After discussion of these risks and benefits, the patient voiced understanding and agreed to proceed. Controlled substances consent verbally signed. UDS and David ordered.  5. Labs/studies: UDS 10-19-21 negative.  6. Follow-up: 1 month  Patient screened positive for depression based on a PHQ-9 score of 16 on 1/11/2022. Follow-up recommendations include: Suicide Risk Assessment performed.         TREATMENT PLAN/GOALS: Continue supportive psychotherapy efforts and medications as indicated. Treatment and medication options discussed  during today's visit. Patient ackowledged and verbally consented to continue with current treatment plan and was educated on the importance of compliance with treatment and follow-up appointments.      MEDICATION ISSUES:  SHOSHANA reviewed as expected.  Discussed medication options and treatment plan of prescribed medication as well as the risks, benefits, and side effects including potential falls, possible impaired driving and metabolic adversities among others. Patient is agreeable to call the office with any worsening of symptoms or onset of side effects. Patient is agreeable to call 911 or go to the nearest ER should he/she begin having SI/HI. No medication side effects or related complaints today.     MEDS ORDERED DURING VISIT:  New Medications Ordered This Visit   Medications   • amphetamine-dextroamphetamine XR (Adderall XR) 20 MG 24 hr capsule     Sig: Take 1 capsule by mouth Every Morning for 30 days     Dispense:  30 capsule     Refill:  0       Return in about 1 month (around 2/11/2022) for Next scheduled follow up.         This document has been electronically signed by HERLINDA Webster  January 11, 2022 11:03 EST      Part of this note may be an electronic transcription/translation of spoken language to printed text using the Dragon Dictation System.

## 2022-02-15 ENCOUNTER — TELEMEDICINE (OUTPATIENT)
Dept: PSYCHIATRY | Facility: CLINIC | Age: 37
End: 2022-02-15

## 2022-02-15 DIAGNOSIS — F51.05 INSOMNIA DUE TO MENTAL DISORDER: ICD-10-CM

## 2022-02-15 DIAGNOSIS — F41.1 GENERALIZED ANXIETY DISORDER: ICD-10-CM

## 2022-02-15 DIAGNOSIS — F33.1 MAJOR DEPRESSIVE DISORDER, RECURRENT EPISODE, MODERATE: Primary | ICD-10-CM

## 2022-02-15 DIAGNOSIS — F43.10 POST TRAUMATIC STRESS DISORDER (PTSD): ICD-10-CM

## 2022-02-15 DIAGNOSIS — F90.0 ADHD (ATTENTION DEFICIT HYPERACTIVITY DISORDER), INATTENTIVE TYPE: ICD-10-CM

## 2022-02-15 PROCEDURE — 99214 OFFICE O/P EST MOD 30 MIN: CPT | Performed by: NURSE PRACTITIONER

## 2022-02-15 RX ORDER — DEXTROAMPHETAMINE SACCHARATE, AMPHETAMINE ASPARTATE MONOHYDRATE, DEXTROAMPHETAMINE SULFATE AND AMPHETAMINE SULFATE 2.5; 2.5; 2.5; 2.5 MG/1; MG/1; MG/1; MG/1
20 CAPSULE, EXTENDED RELEASE ORAL EVERY MORNING
COMMUNITY
End: 2022-02-15 | Stop reason: SDUPTHER

## 2022-02-15 RX ORDER — DEXTROAMPHETAMINE SACCHARATE, AMPHETAMINE ASPARTATE MONOHYDRATE, DEXTROAMPHETAMINE SULFATE AND AMPHETAMINE SULFATE 5; 5; 5; 5 MG/1; MG/1; MG/1; MG/1
20 CAPSULE, EXTENDED RELEASE ORAL EVERY MORNING
Qty: 30 CAPSULE | Refills: 0 | Status: SHIPPED | OUTPATIENT
Start: 2022-02-15 | End: 2022-03-15 | Stop reason: SDUPTHER

## 2022-02-15 NOTE — PROGRESS NOTES
Subjective   Devendra Rodriguez is a 36 y.o. male who presents today for follow up. Mode of visit: Video  Location of provider: [address], [place] [address].  Location of patient: Home  Does the patient consent to use a video/audio connection for your medical care today? Yes  The visit included audio and video interaction. No technical issues occurred during this visit.        Chief Complaint:  Depression, anxiety, attention issues    History of Present Illness: Depression over the past month- has been better  Sleep- good  Interest- Denies anhedonia  Guilt- Denies  Energy- good  Concentration- denies  Appetite- good  Psychomotor retardation/agitation- Denies  Suicidal thoughts or hopelessness- denies hopelessness, si or hi.     Anxiety over the past month- has been better  Anxious, nervous or worried on most days about a number of events or activities- less worries  No control over worries- able to manage better  Irritability- denies  Concentration- see above  Sleep- see above  Restlessness- denies  Tension in muscles- endorses    PTSD: Denies current symptoms of PTSD.     ADHD: focus and concentration has been better. Completing tasks easier at home and work.     10/12/21 ADHD: Patient reports he started having issues with focus in 2014.  Endorses forgetfulness.  Difficulty following conversations.  Difficulty completing tasks.  Symptoms are present at home and at work.  No family history of ADHD diagnosis, but states his dad and brother probably have ADHD.  Patient had ADHD testing on September 29, 2021 through Kaden Barajas, a licensed clinical psychologist.  Testing results found that the patient met criteria for adult attention deficit disorder with hyperactivity.  Symptoms included distractibility, organizational ability, short-term memory, avoidance, restlessness nest, high intensity and difficulties with executive functioning.  On the adult ADHD self-report scale symptom  checklist, patient reported chronic difficulties with organizing tasks, remembering appointments or obligations, avoiding difficult task, continual fidgeting or squirming and feeling overly active.  No indications of drug-seeking behavior.  Acknowledged the presence of 22 of the 24 listed symptoms of ADHD in the DSM-V.    Psychiatric Review of Systems: Patient denies any current or previous hallucinations/delusions, paranoia, manic symptoms.     PHQ-9 Depression Screening  PHQ-9 Total Score:      Little interest or pleasure in doing things?     Feeling down, depressed, or hopeless?     Trouble falling or staying asleep, or sleeping too much?     Feeling tired or having little energy?     Poor appetite or overeating?     Feeling bad about yourself - or that you are a failure or have let yourself or your family down?     Trouble concentrating on things, such as reading the newspaper or watching television?     Moving or speaking so slowly that other people could have noticed? Or the opposite - being so fidgety or restless that you have been moving around a lot more than usual?     Thoughts that you would be better off dead, or of hurting yourself in some way?     PHQ-9 Total Score       WILLEM-7         Past Surgical History:  Past Surgical History:   Procedure Laterality Date   • ENDOSCOPY  01/23/2017    DR. ROBERTS       Problem List:  Patient Active Problem List   Diagnosis   • Anxiety   • Broken bones   • Degeneration of lumbar intervertebral disc   • Depressive disorder   • Gastric reflux   • Gastroesophageal reflux disease   • Low back pain       Allergy:   No Known Allergies     Discontinued Medications:  Medications Discontinued During This Encounter   Medication Reason   • amphetamine-dextroamphetamine XR (ADDERALL XR) 10 MG 24 hr capsule Duplicate order       Current Medications:   Current Outpatient Medications   Medication Sig Dispense Refill   • amphetamine-dextroamphetamine XR (Adderall XR) 20 MG 24 hr  capsule Take 1 capsule by mouth Every Morning for 30 days 30 capsule 0     No current facility-administered medications for this visit.       Past Medical History:  Past Medical History:   Diagnosis Date   • Anxiety    • Benign essential hypertension    • GERD (gastroesophageal reflux disease)    • Head injury    • PTSD (post-traumatic stress disorder)    • Suicide attempt (HCC)        Past Psychiatric History:  Began Treatment: 2016  Diagnoses: Depression, anxiety, PTSD  Psychiatrist: Denies  Therapist: Reports seeing a therapist at [facility] on and off for 5 to 6 years, last seeing them around 2019.  Admission History: [facility] around 2016  Medication Trials: Multiple antidepressants, patient unable to identify medication names at this time.  Self Harm: Denies  Suicide Attempts: Denies    Substance Abuse History:   Types: Reports using cocaine from 2004 until 2006.  Patient reports using marijuana once or twice since 2006.  Withdrawal Symptoms: Denies  Longest Period Sober: Since 2006  AA: Denies    Social History:  Martial Status:  x1  Employed: Works at [employer] in [place] for the past 2 years  Kids: 2 children, ages 10 and 6  House: Lives by self   History: Denies    Social History     Socioeconomic History   • Marital status:    Tobacco Use   • Smoking status: Never Smoker   • Smokeless tobacco: Current User     Types: Snuff   Vaping Use   • Vaping Use: Never used   Substance and Sexual Activity   • Alcohol use: Yes     Comment: DRINKS 8-14 DRINKS PER WEEK   • Drug use: Never   • Sexual activity: Yes       Family History:   Suicide Attempts: Denies  Suicide Completions: Denies      Family History   Problem Relation Age of Onset   • Dementia Paternal Grandfather        Access to Firearms: Denies    Mental Status Exam:     Appearance: good eye contact, normal street clothes, groomed, sitting in chair   Behavior: pleasant and cooperative  Motor: no  "abnormal  Speech: normal rhythm, rate, volume, tone, not hyperverbal, not pressured, normal prosidy  Mood: \" Better \"  Affect: euthymic  Thought Content: negative suicidal ideations, negative homicidal ideations, negative obsessions  Perceptions: negative auditory hallucinations, negative visual hallucinations, negative delusions, negative paranoia  Thought Process: goal directed, linear  Insight/Judgement: fair/fair  Cognition: grossly intact  Attention: intact  Orientation: person, place, time and situation  Memory: intact    Review of Systems:     Constitutional: Denies fatigue, night sweats  Eyes: Denies double vision, blurred vision  HENT: Denies vertigo, recent head injury  Cardiovascular: Denies chest pain, irregular heartbeats  Respiratory: Denies productive cough, shortness of breath  Gastrointestinal: Denies nausea, vomiting  Genitourinary: Denies dysuria, urinary retention  Integument: Denies hair growth change, new skin lesions  Neurologic: Denies altered mental status, seizures  Musculoskeletal: Denies joint swelling, limitation of motion  Endocrine: Denies cold intolerance, heat intolerance  Psychiatric: Admits anxiety, depression, attention issues. Denies hussain, post-traumatic stress disorder, obsessive compulsive disorder, psychosis.   Allergic-immunologic: Denies frequent illnesses      Vital Signs:   There were no vitals taken for this visit.     Lab Results:   Office Visit on 07/01/2021   Component Date Value Ref Range Status   • COVID19 07/01/2021 Not Detected  Not Detected - Ref. Range Final       EKG Results:  No orders to display       Imaging Results:  No Images in the past 120 days found..      Assessment/Plan   Diagnoses and all orders for this visit:    1. Major depressive disorder, recurrent episode, moderate (HCC) (Primary)    2. Generalized anxiety disorder    3. ADHD (attention deficit hyperactivity disorder), inattentive type  -     amphetamine-dextroamphetamine XR (Adderall XR) 20 MG " 24 hr capsule; Take 1 capsule by mouth Every Morning for 30 days  Dispense: 30 capsule; Refill: 0    4. Post traumatic stress disorder (PTSD)    5. Insomnia due to mental disorder      CONTINUE adderall to target adhd symptoms. Has tolerated medication well with no side effects. Declines antidepressant at this time. 10 minutes of supportive psychotherapy with goal to strengthen defenses, promote problems solving, restore adaptive functioning and provide symptom relief. The therapeutic alliance was strengthened to encourage the patient to express their thoughts and feelings. Esteem building was enhanced through praise, reassurance, normalizing and encouragement. Coping skills were enhanced to build distress tolerance skills and emotional regulation. Patient given education on medication side effects, diagnosis/illness and relapse symptoms. Plan to continue supportive psychotherapy in next appointment to provide symptom relief. 1 month    10/12/21 Patient had testing for ADHD performed on September 29, 2021 by Pedro Barajas, a licensed clinical psychologist.  Records of the testing were sent to this office and reviewed by me.  Per testing results, patient met criteria for adult attention deficit disorder with hyperactivity.      Visit Diagnoses:    ICD-10-CM ICD-9-CM   1. Major depressive disorder, recurrent episode, moderate (HCC)  F33.1 296.32   2. Generalized anxiety disorder  F41.1 300.02   3. ADHD (attention deficit hyperactivity disorder), inattentive type  F90.0 314.00   4. Post traumatic stress disorder (PTSD)  F43.10 309.81   5. Insomnia due to mental disorder  F51.05 300.9     327.02       PLAN:  1. Safety: No acute safety concerns.   2. Referral: N/a  3. Risk Assessment: Risk of self-harm acutely is moderate.  Risk factors include anxiety disorder, mood disorder, and recent psychosocial stressors (pandemic). Protective factors include no family history, denies access to guns/weapons, no present SI, no  history of suicide attempts or self-harm in the past, minimal AODA, healthcare seeking, future orientation, willingness to engage in care.  Risk of self-harm chronically is also moderate, but could be further elevated in the event of treatment noncompliance and/or AODA.  4. Medications: CONTINUE adderall xr 20mg po qday to target ADHD symptoms. Risks, benefits, side effects discussed with patient including elevated heart rate, elevated blood pressure, irritability, insomnia, sexual dysfunction, appetite suppressing properties, psychosis.  After discussion of these risks and benefits, the patient voiced understanding and agreed to proceed. Controlled substances consent verbally signed. UDS and Shoshana ordered.  5. Labs/studies: UDS 10-19-21 negative.  6. Follow-up: 1 month  Patient screened positive for depression based on a PHQ-9 score of 16 on 1/11/2022. Follow-up recommendations include: Suicide Risk Assessment performed.         TREATMENT PLAN/GOALS: Continue supportive psychotherapy efforts and medications as indicated. Treatment and medication options discussed during today's visit. Patient ackowledged and verbally consented to continue with current treatment plan and was educated on the importance of compliance with treatment and follow-up appointments.      MEDICATION ISSUES:  SHOSHANA reviewed as expected.  Discussed medication options and treatment plan of prescribed medication as well as the risks, benefits, and side effects including potential falls, possible impaired driving and metabolic adversities among others. Patient is agreeable to call the office with any worsening of symptoms or onset of side effects. Patient is agreeable to call 911 or go to the nearest ER should he/she begin having SI/HI. No medication side effects or related complaints today.     MEDS ORDERED DURING VISIT:  New Medications Ordered This Visit   Medications   • amphetamine-dextroamphetamine XR (Adderall XR) 20 MG 24 hr capsule      Sig: Take 1 capsule by mouth Every Morning for 30 days     Dispense:  30 capsule     Refill:  0       Return in about 1 month (around 3/15/2022) for Next scheduled follow up.         This document has been electronically signed by HERLINDA Webster  February 15, 2022 08:17 EST      Part of this note may be an electronic transcription/translation of spoken language to printed text using the Dragon Dictation System.

## 2022-03-15 ENCOUNTER — TELEMEDICINE (OUTPATIENT)
Dept: PSYCHIATRY | Facility: CLINIC | Age: 37
End: 2022-03-15

## 2022-03-15 DIAGNOSIS — F43.10 POST TRAUMATIC STRESS DISORDER (PTSD): ICD-10-CM

## 2022-03-15 DIAGNOSIS — F41.1 GENERALIZED ANXIETY DISORDER: ICD-10-CM

## 2022-03-15 DIAGNOSIS — F90.0 ADHD (ATTENTION DEFICIT HYPERACTIVITY DISORDER), INATTENTIVE TYPE: ICD-10-CM

## 2022-03-15 DIAGNOSIS — F51.05 INSOMNIA DUE TO MENTAL DISORDER: ICD-10-CM

## 2022-03-15 DIAGNOSIS — F33.1 MAJOR DEPRESSIVE DISORDER, RECURRENT EPISODE, MODERATE: Primary | ICD-10-CM

## 2022-03-15 PROCEDURE — 99214 OFFICE O/P EST MOD 30 MIN: CPT | Performed by: NURSE PRACTITIONER

## 2022-03-15 RX ORDER — DEXTROAMPHETAMINE SACCHARATE, AMPHETAMINE ASPARTATE MONOHYDRATE, DEXTROAMPHETAMINE SULFATE AND AMPHETAMINE SULFATE 5; 5; 5; 5 MG/1; MG/1; MG/1; MG/1
20 CAPSULE, EXTENDED RELEASE ORAL EVERY MORNING
Qty: 30 CAPSULE | Refills: 0 | Status: SHIPPED | OUTPATIENT
Start: 2022-03-15 | End: 2022-04-15 | Stop reason: HOSPADM

## 2022-03-15 NOTE — PROGRESS NOTES
Subjective   Devendra Rodriguez is a 36 y.o. male who presents today for follow up. Mode of visit: Video  Location of provider: [address], [place] [address].  Location of patient: Home  Does the patient consent to use a video/audio connection for your medical care today? Yes  The visit included audio and video interaction. No technical issues occurred during this visit.        Chief Complaint:  Depression, anxiety, attention issues    History of Present Illness: Depression over the past month- has been better  Sleep- good  Interest- Denies anhedonia  Guilt- Denies  Energy- good  Concentration- denies  Appetite- good  Psychomotor retardation/agitation- Denies  Suicidal thoughts or hopelessness- denies hopelessness, si or hi.     Anxiety over the past month- has been better  Anxious, nervous or worried on most days about a number of events or activities- less worries  No control over worries- able to manage better  Irritability- denies  Concentration- see above  Sleep- see above  Restlessness- denies  Tension in muscles- endorses    PTSD: Denies current symptoms of PTSD.     ADHD: focus and concentration has been better. Completing tasks easier at home and work. Less forgetful.    10/12/21 ADHD: Patient reports he started having issues with focus in 2014.  Endorses forgetfulness.  Difficulty following conversations.  Difficulty completing tasks.  Symptoms are present at home and at work.  No family history of ADHD diagnosis, but states his dad and brother probably have ADHD.  Patient had ADHD testing on September 29, 2021 through Kaden Barajas, a licensed clinical psychologist.  Testing results found that the patient met criteria for adult attention deficit disorder with hyperactivity.  Symptoms included distractibility, organizational ability, short-term memory, avoidance, restlessness nest, high intensity and difficulties with executive functioning.  On the adult ADHD self-report scale  symptom checklist, patient reported chronic difficulties with organizing tasks, remembering appointments or obligations, avoiding difficult task, continual fidgeting or squirming and feeling overly active.  No indications of drug-seeking behavior.  Acknowledged the presence of 22 of the 24 listed symptoms of ADHD in the DSM-V.    Psychiatric Review of Systems: Patient denies any current or previous hallucinations/delusions, paranoia, manic symptoms.     PHQ-9 Depression Screening  PHQ-9 Total Score:      Little interest or pleasure in doing things?     Feeling down, depressed, or hopeless?     Trouble falling or staying asleep, or sleeping too much?     Feeling tired or having little energy?     Poor appetite or overeating?     Feeling bad about yourself - or that you are a failure or have let yourself or your family down?     Trouble concentrating on things, such as reading the newspaper or watching television?     Moving or speaking so slowly that other people could have noticed? Or the opposite - being so fidgety or restless that you have been moving around a lot more than usual?     Thoughts that you would be better off dead, or of hurting yourself in some way?     PHQ-9 Total Score       WILLEM-7         Past Surgical History:  Past Surgical History:   Procedure Laterality Date   • ENDOSCOPY  01/23/2017    DR. ROBERTS       Problem List:  Patient Active Problem List   Diagnosis   • Anxiety   • Broken bones   • Degeneration of lumbar intervertebral disc   • Depressive disorder   • Gastric reflux   • Gastroesophageal reflux disease   • Low back pain       Allergy:   No Known Allergies     Discontinued Medications:  Medications Discontinued During This Encounter   Medication Reason   • amphetamine-dextroamphetamine XR (Adderall XR) 20 MG 24 hr capsule Reorder       Current Medications:   Current Outpatient Medications   Medication Sig Dispense Refill   • amphetamine-dextroamphetamine XR (Adderall XR) 20 MG 24 hr  capsule Take 1 capsule by mouth Every Morning for 30 days 30 capsule 0     No current facility-administered medications for this visit.       Past Medical History:  Past Medical History:   Diagnosis Date   • ADHD (attention deficit hyperactivity disorder)    • Anxiety    • Benign essential hypertension    • GERD (gastroesophageal reflux disease)    • Head injury    • PTSD (post-traumatic stress disorder)    • Suicide attempt (HCC)        Past Psychiatric History:  Began Treatment: 2016  Diagnoses: Depression, anxiety, PTSD  Psychiatrist: Denies  Therapist: [facility], last seeing them around 2019.  Admission History: [facility] around 2016  Medication Trials: Multiple antidepressants, patient unable to identify medication names at this time.  Self Harm: Denies  Suicide Attempts: Denies    Substance Abuse History:   Types: Reports using cocaine from 2004 until 2006.  Patient reports using marijuana once or twice since 2006.  Withdrawal Symptoms: Denies  Longest Period Sober: Since 2006  AA: Denies    Social History:  Martial Status:  x1  Employed: Works at [employer] in [place] for the past 2 years  Kids: 2 children, ages 10 and 6  House: Lives by self   History: Denies    Social History     Socioeconomic History   • Marital status:    Tobacco Use   • Smoking status: Never Smoker   • Smokeless tobacco: Current User     Types: Snuff   Vaping Use   • Vaping Use: Never used   Substance and Sexual Activity   • Alcohol use: Yes     Comment: DRINKS 8-14 DRINKS PER WEEK   • Drug use: Never   • Sexual activity: Yes       Family History:   Suicide Attempts: Denies  Suicide Completions: Denies      Family History   Problem Relation Age of Onset   • Dementia Paternal Grandfather        Access to Firearms: Denies    Mental Status Exam:     Appearance: good eye contact, normal street clothes, groomed, sitting in  "chair   Behavior: pleasant and cooperative  Motor: no abnormal  Speech: normal rhythm, rate, volume, tone, not hyperverbal, not pressured, normal prosidy  Mood: \" Better \"  Affect: euthymic  Thought Content: negative suicidal ideations, negative homicidal ideations, negative obsessions  Perceptions: negative auditory hallucinations, negative visual hallucinations, negative delusions, negative paranoia  Thought Process: goal directed, linear  Insight/Judgement: fair/fair  Cognition: grossly intact  Attention: intact  Orientation: person, place, time and situation  Memory: intact    Review of Systems:     Constitutional: Denies fatigue, night sweats  Eyes: Denies double vision, blurred vision  HENT: Denies vertigo, recent head injury  Cardiovascular: Denies chest pain, irregular heartbeats  Respiratory: Denies productive cough, shortness of breath  Gastrointestinal: Denies nausea, vomiting  Genitourinary: Denies dysuria, urinary retention  Integument: Denies hair growth change, new skin lesions  Neurologic: Denies altered mental status, seizures  Musculoskeletal: Denies joint swelling, limitation of motion  Endocrine: Denies cold intolerance, heat intolerance  Psychiatric: Admits anxiety, depression, attention issues. Denies hussain, post-traumatic stress disorder, obsessive compulsive disorder, psychosis.   Allergic-immunologic: Denies frequent illnesses      Vital Signs:   There were no vitals taken for this visit.     Lab Results:   Office Visit on 07/01/2021   Component Date Value Ref Range Status   • COVID19 07/01/2021 Not Detected  Not Detected - Ref. Range Final       EKG Results:  No orders to display       Imaging Results:  No Images in the past 120 days found..      Assessment/Plan   Diagnoses and all orders for this visit:    1. Major depressive disorder, recurrent episode, moderate (HCC) (Primary)    2. Generalized anxiety disorder    3. ADHD (attention deficit hyperactivity disorder), inattentive type  -    "  amphetamine-dextroamphetamine XR (Adderall XR) 20 MG 24 hr capsule; Take 1 capsule by mouth Every Morning for 30 days  Dispense: 30 capsule; Refill: 0    4. Post traumatic stress disorder (PTSD)    5. Insomnia due to mental disorder      Continue adderall to target adhd symptoms. Has tolerated medication well with no side effects. Discussed possibility of adding antidepressant, but patient declines at this time. 10 minutes of supportive psychotherapy with goal to strengthen defenses, promote problems solving, restore adaptive functioning and provide symptom relief. The therapeutic alliance was strengthened to encourage the patient to express their thoughts and feelings. Esteem building was enhanced through praise, reassurance, normalizing and encouragement. Coping skills were enhanced to build distress tolerance skills and emotional regulation. Patient given education on medication side effects, diagnosis/illness and relapse symptoms. Plan to continue supportive psychotherapy in next appointment to provide symptom relief. 12 weeks    10/12/21 Patient had testing for ADHD performed on September 29, 2021 by Pedro Barajas, a licensed clinical psychologist.  Records of the testing were sent to this office and reviewed by me.  Per testing results, patient met criteria for adult attention deficit disorder with hyperactivity.      Visit Diagnoses:    ICD-10-CM ICD-9-CM   1. Major depressive disorder, recurrent episode, moderate (HCC)  F33.1 296.32   2. Generalized anxiety disorder  F41.1 300.02   3. ADHD (attention deficit hyperactivity disorder), inattentive type  F90.0 314.00   4. Post traumatic stress disorder (PTSD)  F43.10 309.81   5. Insomnia due to mental disorder  F51.05 300.9     327.02       PLAN:  1. Safety: No acute safety concerns.   2. Referral: N/a  3. Risk Assessment: Risk of self-harm acutely is moderate.  Risk factors include anxiety disorder, mood disorder, and recent psychosocial stressors (pandemic).  Protective factors include no family history, denies access to guns/weapons, no present SI, no history of suicide attempts or self-harm in the past, minimal AODA, healthcare seeking, future orientation, willingness to engage in care.  Risk of self-harm chronically is also moderate, but could be further elevated in the event of treatment noncompliance and/or AODA.  4. Medications: CONTINUE adderall xr 20mg po qday to target ADHD symptoms. Risks, benefits, side effects discussed with patient including elevated heart rate, elevated blood pressure, irritability, insomnia, sexual dysfunction, appetite suppressing properties, psychosis.  After discussion of these risks and benefits, the patient voiced understanding and agreed to proceed. Controlled substances consent verbally signed. UDS and Shoshana ordered.  5. Labs/studies: UDS 10-19-21 negative; UDS  6. Follow-up: 12 weeks  Patient screened positive for depression based on a PHQ-9 score of  on . Follow-up recommendations include: Suicide Risk Assessment performed.         TREATMENT PLAN/GOALS: Continue supportive psychotherapy efforts and medications as indicated. Treatment and medication options discussed during today's visit. Patient ackowledged and verbally consented to continue with current treatment plan and was educated on the importance of compliance with treatment and follow-up appointments.      MEDICATION ISSUES:  SHOSHANA reviewed as expected.  Discussed medication options and treatment plan of prescribed medication as well as the risks, benefits, and side effects including potential falls, possible impaired driving and metabolic adversities among others. Patient is agreeable to call the office with any worsening of symptoms or onset of side effects. Patient is agreeable to call 911 or go to the nearest ER should he/she begin having SI/HI. No medication side effects or related complaints today.     MEDS ORDERED DURING VISIT:  New Medications Ordered This Visit    Medications   • amphetamine-dextroamphetamine XR (Adderall XR) 20 MG 24 hr capsule     Sig: Take 1 capsule by mouth Every Morning for 30 days     Dispense:  30 capsule     Refill:  0       Return in about 12 weeks (around 6/7/2022) for Next scheduled follow up.         This document has been electronically signed by HERLINDA Webster  March 15, 2022 09:34 EDT      Part of this note may be an electronic transcription/translation of spoken language to printed text using the Dragon Dictation System.

## 2022-03-18 PROBLEM — D17.1 LIPOMA OF TORSO: Status: ACTIVE | Noted: 2022-03-18

## 2022-03-29 NOTE — PROGRESS NOTES
"Chief Complaint: Cyst    Subjective         History of Present Illness  Devendra Rodriguez is a 36 y.o. male presents to [facility] to be seen for mass on the back.  This is about 2 cm in size and he would like to have it removed as it is bothersome and worrisome to him.      Objective     Past Medical History:   Diagnosis Date   • ADHD (attention deficit hyperactivity disorder)    • Anxiety    • Benign essential hypertension    • Cyst of skin    • GERD (gastroesophageal reflux disease)    • Head injury    • PTSD (post-traumatic stress disorder)    • Suicide attempt (HCC)        Past Surgical History:   Procedure Laterality Date   • ENDOSCOPY  01/23/2017    DR. ROBERTS         Current Outpatient Medications:   •  amphetamine-dextroamphetamine XR (Adderall XR) 20 MG 24 hr capsule, Take 1 capsule by mouth Every Morning for 30 days, Disp: 30 capsule, Rfl: 0    No Known Allergies     Family History   Problem Relation Age of Onset   • Dementia Paternal Grandfather        Social History     Socioeconomic History   • Marital status:    Tobacco Use   • Smoking status: Never Smoker   • Smokeless tobacco: Current User     Types: Snuff   Vaping Use   • Vaping Use: Never used   Substance and Sexual Activity   • Alcohol use: Yes     Comment: DRINKS 8-14 DRINKS PER WEEK   • Drug use: Never   • Sexual activity: Yes       Vital Signs:   Resp 16   Ht 170.2 cm (67.01\")   Wt 71.2 kg (157 lb)   BMI 24.58 kg/m²    Review of Systems    Physical Exam  Vitals and nursing note reviewed.   Constitutional:       General: He is not in acute distress.     Appearance: Normal appearance. He is well-developed.   HENT:      Head: Normocephalic and atraumatic.   Eyes:      Extraocular Movements: Extraocular movements intact.      Pupils: Pupils are equal, round, and reactive to light.   Cardiovascular:      Pulses: Normal pulses.   Pulmonary:      Effort: Pulmonary effort is normal. No retractions.      " Breath sounds: Normal air entry. No wheezing.   Abdominal:      General: There is no distension.      Palpations: Abdomen is soft.      Tenderness: There is no abdominal tenderness.      Hernia: No hernia is present.   Musculoskeletal:         General: No swelling or deformity.      Cervical back: Neck supple.   Skin:     General: Skin is warm and dry.      Findings: No erythema.      Comments: + 2 cm mass on lower back   Neurological:      General: No focal deficit present.      Mental Status: He is alert and oriented to person, place, and time.      Motor: Motor function is intact.   Psychiatric:         Mood and Affect: Mood normal.         Thought Content: Thought content normal.          Result Review :               Assessment and Plan    Diagnoses and all orders for this visit:    1. Lipoma of torso (Primary)  -     Case Request; Standing  -     Case Request    Plan for excision of back mass on 4/15/22      Follow Up {Instructions Charge Capture  Follow-up Communications :23}  No follow-ups on file.  Patient was given instructions and counseling regarding his condition or for health maintenance advice. Please see specific information pulled into the AVS if appropriate.         This document has been electronically signed by Joycelyn Salinas MD  March 30, 2022 09:01 EDT

## 2022-03-30 ENCOUNTER — OFFICE VISIT (OUTPATIENT)
Dept: SURGERY | Facility: CLINIC | Age: 37
End: 2022-03-30

## 2022-03-30 VITALS — RESPIRATION RATE: 16 BRPM | HEIGHT: 67 IN | WEIGHT: 157 LBS | BODY MASS INDEX: 24.64 KG/M2

## 2022-03-30 DIAGNOSIS — R22.2 MASS OF SKIN OF BACK: Primary | ICD-10-CM

## 2022-03-30 DIAGNOSIS — D17.1 LIPOMA OF TORSO: Primary | ICD-10-CM

## 2022-03-30 PROCEDURE — 99203 OFFICE O/P NEW LOW 30 MIN: CPT | Performed by: SURGERY

## 2022-03-30 RX ORDER — ONDANSETRON 2 MG/ML
4 INJECTION INTRAMUSCULAR; INTRAVENOUS EVERY 6 HOURS PRN
Status: CANCELLED | OUTPATIENT
Start: 2022-03-30

## 2022-04-11 ENCOUNTER — LAB (OUTPATIENT)
Dept: LAB | Facility: HOSPITAL | Age: 37
End: 2022-04-11

## 2022-04-11 DIAGNOSIS — R22.2 MASS OF SKIN OF BACK: ICD-10-CM

## 2022-04-11 NOTE — PRE-PROCEDURE INSTRUCTIONS
PRE-OP INSTRUCTIONS REVIEWED WITH PATIENT: FASTING/BATHING/ARRIVAL PROCEDURES.  INSTRUCTED TO TAKE A.M. DAY OF SURGERY: NONE   UNDERSTANDING VERBALIZED.

## 2022-04-12 ENCOUNTER — LAB (OUTPATIENT)
Dept: LAB | Facility: HOSPITAL | Age: 37
End: 2022-04-12

## 2022-04-12 PROCEDURE — U0004 COV-19 TEST NON-CDC HGH THRU: HCPCS

## 2022-04-12 PROCEDURE — C9803 HOPD COVID-19 SPEC COLLECT: HCPCS

## 2022-04-13 LAB — SARS-COV-2 RNA PNL SPEC NAA+PROBE: NOT DETECTED

## 2022-04-15 ENCOUNTER — ANESTHESIA (OUTPATIENT)
Dept: PERIOP | Facility: HOSPITAL | Age: 37
End: 2022-04-15

## 2022-04-15 ENCOUNTER — HOSPITAL ENCOUNTER (OUTPATIENT)
Facility: HOSPITAL | Age: 37
Setting detail: HOSPITAL OUTPATIENT SURGERY
Discharge: HOME | End: 2022-04-15
Attending: SURGERY | Admitting: SURGERY

## 2022-04-15 ENCOUNTER — ANESTHESIA EVENT (OUTPATIENT)
Dept: PERIOP | Facility: HOSPITAL | Age: 37
End: 2022-04-15

## 2022-04-15 VITALS
RESPIRATION RATE: 16 BRPM | TEMPERATURE: 97 F | HEIGHT: 67 IN | BODY MASS INDEX: 23.46 KG/M2 | SYSTOLIC BLOOD PRESSURE: 139 MMHG | HEART RATE: 64 BPM | DIASTOLIC BLOOD PRESSURE: 105 MMHG | OXYGEN SATURATION: 98 % | WEIGHT: 149.47 LBS

## 2022-04-15 DIAGNOSIS — F90.0 ADHD (ATTENTION DEFICIT HYPERACTIVITY DISORDER), INATTENTIVE TYPE: ICD-10-CM

## 2022-04-15 DIAGNOSIS — D17.1 LIPOMA OF TORSO: ICD-10-CM

## 2022-04-15 PROCEDURE — 25010000002 PROPOFOL 10 MG/ML EMULSION: Performed by: NURSE ANESTHETIST, CERTIFIED REGISTERED

## 2022-04-15 PROCEDURE — 88304 TISSUE EXAM BY PATHOLOGIST: CPT | Performed by: SURGERY

## 2022-04-15 PROCEDURE — 25010000002 MIDAZOLAM PER 1 MG: Performed by: ANESTHESIOLOGY

## 2022-04-15 PROCEDURE — 25010000002 FENTANYL CITRATE (PF) 50 MCG/ML SOLUTION: Performed by: NURSE ANESTHETIST, CERTIFIED REGISTERED

## 2022-04-15 PROCEDURE — 11402 EXC TR-EXT B9+MARG 1.1-2 CM: CPT | Performed by: SURGERY

## 2022-04-15 PROCEDURE — 25010000002 ONDANSETRON PER 1 MG: Performed by: NURSE ANESTHETIST, CERTIFIED REGISTERED

## 2022-04-15 PROCEDURE — 12031 INTMD RPR S/A/T/EXT 2.5 CM/<: CPT | Performed by: SURGERY

## 2022-04-15 PROCEDURE — 25010000002 DEXAMETHASONE PER 1 MG: Performed by: NURSE ANESTHETIST, CERTIFIED REGISTERED

## 2022-04-15 PROCEDURE — 25010000002 KETOROLAC TROMETHAMINE PER 15 MG: Performed by: NURSE ANESTHETIST, CERTIFIED REGISTERED

## 2022-04-15 RX ORDER — MEPERIDINE HYDROCHLORIDE 25 MG/ML
12.5 INJECTION INTRAMUSCULAR; INTRAVENOUS; SUBCUTANEOUS
Status: DISCONTINUED | OUTPATIENT
Start: 2022-04-15 | End: 2022-04-15 | Stop reason: HOSPADM

## 2022-04-15 RX ORDER — SODIUM CHLORIDE, SODIUM LACTATE, POTASSIUM CHLORIDE, CALCIUM CHLORIDE 600; 310; 30; 20 MG/100ML; MG/100ML; MG/100ML; MG/100ML
9 INJECTION, SOLUTION INTRAVENOUS CONTINUOUS PRN
Status: DISCONTINUED | OUTPATIENT
Start: 2022-04-15 | End: 2022-04-15 | Stop reason: HOSPADM

## 2022-04-15 RX ORDER — SULFAMETHOXAZOLE AND TRIMETHOPRIM 800; 160 MG/1; MG/1
1 TABLET ORAL 2 TIMES DAILY
Qty: 14 TABLET | Refills: 0 | Status: SHIPPED | OUTPATIENT
Start: 2022-04-15 | End: 2022-04-22

## 2022-04-15 RX ORDER — OXYCODONE HYDROCHLORIDE 5 MG/1
5 TABLET ORAL
Status: DISCONTINUED | OUTPATIENT
Start: 2022-04-15 | End: 2022-04-15 | Stop reason: HOSPADM

## 2022-04-15 RX ORDER — FENTANYL CITRATE 50 UG/ML
INJECTION, SOLUTION INTRAMUSCULAR; INTRAVENOUS AS NEEDED
Status: DISCONTINUED | OUTPATIENT
Start: 2022-04-15 | End: 2022-04-15 | Stop reason: SURG

## 2022-04-15 RX ORDER — MAGNESIUM HYDROXIDE 1200 MG/15ML
LIQUID ORAL AS NEEDED
Status: DISCONTINUED | OUTPATIENT
Start: 2022-04-15 | End: 2022-04-15 | Stop reason: HOSPADM

## 2022-04-15 RX ORDER — ONDANSETRON 2 MG/ML
INJECTION INTRAMUSCULAR; INTRAVENOUS AS NEEDED
Status: DISCONTINUED | OUTPATIENT
Start: 2022-04-15 | End: 2022-04-15 | Stop reason: SURG

## 2022-04-15 RX ORDER — DEXAMETHASONE SODIUM PHOSPHATE 4 MG/ML
INJECTION, SOLUTION INTRA-ARTICULAR; INTRALESIONAL; INTRAMUSCULAR; INTRAVENOUS; SOFT TISSUE AS NEEDED
Status: DISCONTINUED | OUTPATIENT
Start: 2022-04-15 | End: 2022-04-15 | Stop reason: SURG

## 2022-04-15 RX ORDER — ONDANSETRON 2 MG/ML
4 INJECTION INTRAMUSCULAR; INTRAVENOUS EVERY 6 HOURS PRN
Status: DISCONTINUED | OUTPATIENT
Start: 2022-04-15 | End: 2022-04-15 | Stop reason: HOSPADM

## 2022-04-15 RX ORDER — CLINDAMYCIN PHOSPHATE 900 MG/50ML
900 INJECTION INTRAVENOUS ONCE
Status: COMPLETED | OUTPATIENT
Start: 2022-04-15 | End: 2022-04-15

## 2022-04-15 RX ORDER — ROCURONIUM BROMIDE 10 MG/ML
INJECTION, SOLUTION INTRAVENOUS AS NEEDED
Status: DISCONTINUED | OUTPATIENT
Start: 2022-04-15 | End: 2022-04-15 | Stop reason: SURG

## 2022-04-15 RX ORDER — HYDROCODONE BITARTRATE AND ACETAMINOPHEN 5; 325 MG/1; MG/1
1-2 TABLET ORAL EVERY 4 HOURS PRN
Qty: 20 TABLET | Refills: 0 | Status: SHIPPED | OUTPATIENT
Start: 2022-04-15 | End: 2022-06-07

## 2022-04-15 RX ORDER — ONDANSETRON 2 MG/ML
4 INJECTION INTRAMUSCULAR; INTRAVENOUS ONCE AS NEEDED
Status: DISCONTINUED | OUTPATIENT
Start: 2022-04-15 | End: 2022-04-15 | Stop reason: HOSPADM

## 2022-04-15 RX ORDER — LIDOCAINE HYDROCHLORIDE 20 MG/ML
INJECTION, SOLUTION INFILTRATION; PERINEURAL AS NEEDED
Status: DISCONTINUED | OUTPATIENT
Start: 2022-04-15 | End: 2022-04-15 | Stop reason: SURG

## 2022-04-15 RX ORDER — BUPIVACAINE HYDROCHLORIDE AND EPINEPHRINE 2.5; 5 MG/ML; UG/ML
INJECTION, SOLUTION EPIDURAL; INFILTRATION; INTRACAUDAL; PERINEURAL AS NEEDED
Status: DISCONTINUED | OUTPATIENT
Start: 2022-04-15 | End: 2022-04-15 | Stop reason: HOSPADM

## 2022-04-15 RX ORDER — PROPOFOL 10 MG/ML
VIAL (ML) INTRAVENOUS AS NEEDED
Status: DISCONTINUED | OUTPATIENT
Start: 2022-04-15 | End: 2022-04-15 | Stop reason: SURG

## 2022-04-15 RX ORDER — PROMETHAZINE HYDROCHLORIDE 12.5 MG/1
25 TABLET ORAL ONCE AS NEEDED
Status: DISCONTINUED | OUTPATIENT
Start: 2022-04-15 | End: 2022-04-15 | Stop reason: HOSPADM

## 2022-04-15 RX ORDER — KETOROLAC TROMETHAMINE 30 MG/ML
INJECTION, SOLUTION INTRAMUSCULAR; INTRAVENOUS AS NEEDED
Status: DISCONTINUED | OUTPATIENT
Start: 2022-04-15 | End: 2022-04-15 | Stop reason: SURG

## 2022-04-15 RX ORDER — ONDANSETRON 4 MG/1
4 TABLET, FILM COATED ORAL ONCE AS NEEDED
Status: DISCONTINUED | OUTPATIENT
Start: 2022-04-15 | End: 2022-04-15 | Stop reason: HOSPADM

## 2022-04-15 RX ORDER — MIDAZOLAM HYDROCHLORIDE 1 MG/ML
2 INJECTION INTRAMUSCULAR; INTRAVENOUS ONCE
Status: COMPLETED | OUTPATIENT
Start: 2022-04-15 | End: 2022-04-15

## 2022-04-15 RX ORDER — ACETAMINOPHEN 500 MG
1000 TABLET ORAL ONCE
Status: COMPLETED | OUTPATIENT
Start: 2022-04-15 | End: 2022-04-15

## 2022-04-15 RX ORDER — DEXMEDETOMIDINE HYDROCHLORIDE 100 UG/ML
INJECTION, SOLUTION INTRAVENOUS AS NEEDED
Status: DISCONTINUED | OUTPATIENT
Start: 2022-04-15 | End: 2022-04-15 | Stop reason: SURG

## 2022-04-15 RX ORDER — PROMETHAZINE HYDROCHLORIDE 25 MG/1
25 SUPPOSITORY RECTAL ONCE AS NEEDED
Status: DISCONTINUED | OUTPATIENT
Start: 2022-04-15 | End: 2022-04-15 | Stop reason: HOSPADM

## 2022-04-15 RX ADMIN — CLINDAMYCIN IN 5 PERCENT DEXTROSE 900 MG: 18 INJECTION, SOLUTION INTRAVENOUS at 10:08

## 2022-04-15 RX ADMIN — PROPOFOL 200 MG: 10 INJECTION, EMULSION INTRAVENOUS at 10:05

## 2022-04-15 RX ADMIN — SUGAMMADEX 200 MG: 100 INJECTION, SOLUTION INTRAVENOUS at 10:30

## 2022-04-15 RX ADMIN — DEXAMETHASONE SODIUM PHOSPHATE 4 MG: 4 INJECTION, SOLUTION INTRA-ARTICULAR; INTRALESIONAL; INTRAMUSCULAR; INTRAVENOUS; SOFT TISSUE at 10:16

## 2022-04-15 RX ADMIN — ROCURONIUM BROMIDE 40 MG: 10 INJECTION INTRAVENOUS at 10:06

## 2022-04-15 RX ADMIN — ACETAMINOPHEN 1000 MG: 500 TABLET ORAL at 08:01

## 2022-04-15 RX ADMIN — DEXMEDETOMIDINE HYDROCHLORIDE 15 MCG: 100 INJECTION, SOLUTION, CONCENTRATE INTRAVENOUS at 10:19

## 2022-04-15 RX ADMIN — KETOROLAC TROMETHAMINE 30 MG: 30 INJECTION, SOLUTION INTRAMUSCULAR; INTRAVENOUS at 10:29

## 2022-04-15 RX ADMIN — MIDAZOLAM HYDROCHLORIDE 2 MG: 1 INJECTION, SOLUTION INTRAMUSCULAR; INTRAVENOUS at 09:41

## 2022-04-15 RX ADMIN — FENTANYL CITRATE 50 MCG: 50 INJECTION, SOLUTION INTRAMUSCULAR; INTRAVENOUS at 10:05

## 2022-04-15 RX ADMIN — FENTANYL CITRATE 50 MCG: 50 INJECTION, SOLUTION INTRAMUSCULAR; INTRAVENOUS at 10:22

## 2022-04-15 RX ADMIN — DEXMEDETOMIDINE HYDROCHLORIDE 15 MCG: 100 INJECTION, SOLUTION, CONCENTRATE INTRAVENOUS at 10:15

## 2022-04-15 RX ADMIN — LIDOCAINE HYDROCHLORIDE 70 MG: 20 INJECTION, SOLUTION INFILTRATION; PERINEURAL at 10:30

## 2022-04-15 RX ADMIN — LIDOCAINE HYDROCHLORIDE 80 MG: 20 INJECTION, SOLUTION INFILTRATION; PERINEURAL at 10:05

## 2022-04-15 RX ADMIN — ONDANSETRON 4 MG: 2 INJECTION INTRAMUSCULAR; INTRAVENOUS at 10:28

## 2022-04-15 RX ADMIN — SODIUM CHLORIDE, POTASSIUM CHLORIDE, SODIUM LACTATE AND CALCIUM CHLORIDE 9 ML/HR: 600; 310; 30; 20 INJECTION, SOLUTION INTRAVENOUS at 07:57

## 2022-04-15 NOTE — OP NOTE
EXCISION LESION  Procedure Report    Patient Name:  Devendra Rodriguez  YOB: 1985    Date of Surgery:  4/15/2022     Indications:  Back cyst    Pre-op Diagnosis:   Lipoma of torso [D17.1]       Post-Op Diagnosis Codes:     * Lipoma of torso [D17.1]    Procedure/CPT® Codes:      Procedure(s):  EXCISION LESION    Staff:  Surgeon(s):  Joycelyn Salinas MD    Assistant: Alida Winchester CSA    Anesthesia: General    Estimated Blood Loss: minimal    Implants:    Nothing was implanted during the procedure    Specimen:          Specimens     ID Source Type Tests Collected By Collected At Frozen?    A Back, Lower Tissue · TISSUE PATHOLOGY EXAM   Joycelyn Salinas MD 4/15/22 1022     Description: back mass (short stitch superior, long stitch lateral)    This specimen was not marked as sent.              Findings: none    Complications: none    Description of Procedure:   After informed consent, the patient was taken to the OR placed supine on the table, and after adequate anesthesia prepped and draped in usual sterile fashion. We began by making elliptical incision around the cyst and dissecting down using electrocautery to reach normal-appearing tissue. The cyst was then excised circumferentially and handed off as a specimen.  It was about 1.5 cm is size.   Electrocautery was used to achieve hemostasis. The wound was copiously irrigated and the deeper layers were closed with 2-0 Vicryl and skin was closed with a running 4 subcuticular stitch. Patient was taken to recovery in good condition. All counts were correct at the end of the case  Assistant: Alida Winchester CSA  was responsible for performing the following activities: Retraction and Closing and their skilled assistance was necessary for the success of this case.    Joycelyn Salinas MD     Date: 4/15/2022  Time: 10:31 EDT

## 2022-04-15 NOTE — ANESTHESIA POSTPROCEDURE EVALUATION
Patient: Devendra Rodriguez    Procedure Summary     Date: 04/15/22 Room / Location: [facility] OR  / [facility]    Anesthesia Start: 1002 Anesthesia Stop: 1049    Procedure: EXCISION LESION (N/A ) Diagnosis:       Lipoma of torso      (Lipoma of torso [D17.1])    Surgeons: Joycelyn Salinas MD Provider: Pablo Dougherty MD    Anesthesia Type: general ASA Status: 2          Anesthesia Type: general    Vitals  Vitals Value Taken Time   /105 04/15/22 1130   Temp 35.9 °C (96.7 °F) 04/15/22 1047   Pulse 64 04/15/22 1130   Resp 20 04/15/22 1108   SpO2 98 % 04/15/22 1130           Post Anesthesia Care and Evaluation    Patient location during evaluation: bedside  Patient participation: complete - patient participated  Level of consciousness: awake and alert  Pain management: adequate  Airway patency: patent  Anesthetic complications: No anesthetic complications  PONV Status: none  Cardiovascular status: acceptable  Respiratory status: acceptable  Hydration status: acceptable    Comments: An Anesthesiologist personally participated in the most demanding procedures (including induction and emergence if applicable) in the anesthesia plan, monitored the course of anesthesia administration at frequent intervals and remained physically present and available for immediate diagnosis and treatment of emergencies.

## 2022-04-15 NOTE — DISCHARGE INSTRUCTIONS
DISCHARGE INSTRUCTIONS  SURGICAL / AMBULATORY  PROCEDURES      For your surgery you had:  General anesthesia (you may have a sore throat for the first 24 hours)    You may experience dizziness, drowsiness, or light-headedness for several hours following surgery/procedure.  Do not stay alone today or tonight.  Limit your activity for 24 hours.  Resume your diet slowly.  Follow whatever special dietary instructions you may have been given by your doctor.  You should not drive or operate machinery, drink alcohol, or sign legally binding documents for 24 hours or while you are taking pain medication.  Last dose of pain medication was given at:   .  NOTIFY YOUR DOCTOR IF YOU EXPERIENCE ANY OF THE FOLLOWING:  Temperature greater than 101 degrees Fahrenheit  Shaking Chills  Redness or excessive drainage from incision  Nausea, vomiting and/or pain that is not controlled by prescribed medications  Increase in bleeding or bleeding that is excessive  Unable to urinate in 6 hours after surgery  If unable to reach your doctor, please go to the closest Emergency Room    SPECIAL INSTRUCTIONS:

## 2022-04-18 DIAGNOSIS — F90.0 ADHD (ATTENTION DEFICIT HYPERACTIVITY DISORDER), INATTENTIVE TYPE: ICD-10-CM

## 2022-04-18 LAB
CYTO UR: NORMAL
LAB AP CASE REPORT: NORMAL
LAB AP CLINICAL INFORMATION: NORMAL
PATH REPORT.FINAL DX SPEC: NORMAL
PATH REPORT.GROSS SPEC: NORMAL

## 2022-04-18 RX ORDER — DEXTROAMPHETAMINE SACCHARATE, AMPHETAMINE ASPARTATE MONOHYDRATE, DEXTROAMPHETAMINE SULFATE AND AMPHETAMINE SULFATE 5; 5; 5; 5 MG/1; MG/1; MG/1; MG/1
20 CAPSULE, EXTENDED RELEASE ORAL EVERY MORNING
Qty: 30 CAPSULE | Refills: 0 | Status: SHIPPED | OUTPATIENT
Start: 2022-04-18 | End: 2022-05-20 | Stop reason: SDUPTHER

## 2022-04-22 PROBLEM — L72.3 SEBACEOUS CYST: Status: ACTIVE | Noted: 2022-04-22

## 2022-04-22 NOTE — PROGRESS NOTES
"Chief Complaint  Follow-up    Subjective          History of Present Illness  The patient is here to follow up on excision of sebaceous cyst.  They are doing well and have no complaints.  Pathology is shown below:    Clinical Information    Comment:    Lipoma of torso      Final Diagnosis   Back mass, excision:               -Epidermal inclusion cyst       Objective   Vital Signs:   Resp 14   Ht 170.2 cm (67\")   Wt 67.6 kg (149 lb)   BMI 23.34 kg/m²     Physical Exam  Vitals and nursing note reviewed.   Constitutional:       General: He is not in acute distress.     Appearance: Normal appearance. He is well-developed.   HENT:      Head: Normocephalic and atraumatic.   Eyes:      Extraocular Movements: Extraocular movements intact.      Pupils: Pupils are equal, round, and reactive to light.   Cardiovascular:      Pulses: Normal pulses.   Pulmonary:      Effort: Pulmonary effort is normal. No retractions.      Breath sounds: Normal air entry. No wheezing.   Abdominal:      General: There is no distension.      Palpations: Abdomen is soft.      Tenderness: There is no abdominal tenderness.      Hernia: No hernia is present.   Musculoskeletal:         General: No swelling or deformity.      Cervical back: Neck supple.   Skin:     General: Skin is warm and dry.      Findings: No erythema.      Comments: Surgical Incision Healing Well   Neurological:      General: No focal deficit present.      Mental Status: He is alert and oriented to person, place, and time.      Motor: Motor function is intact.   Psychiatric:         Mood and Affect: Mood normal.         Thought Content: Thought content normal.            Result Review :                 Assessment and Plan    Diagnoses and all orders for this visit:    1. Sebaceous cyst (Primary)    Follow up prn    Follow Up   Return if symptoms worsen or fail to improve.  Patient was given instructions and counseling regarding his condition or for health maintenance advice. " Please see specific information pulled into the AVS if appropriate.

## 2022-04-25 ENCOUNTER — OFFICE VISIT (OUTPATIENT)
Dept: SURGERY | Facility: CLINIC | Age: 37
End: 2022-04-25

## 2022-04-25 VITALS — RESPIRATION RATE: 14 BRPM | WEIGHT: 149 LBS | HEIGHT: 67 IN | BODY MASS INDEX: 23.39 KG/M2

## 2022-04-25 DIAGNOSIS — L72.3 SEBACEOUS CYST: Primary | ICD-10-CM

## 2022-04-25 PROCEDURE — 99024 POSTOP FOLLOW-UP VISIT: CPT | Performed by: SURGERY

## 2022-05-20 DIAGNOSIS — F90.0 ADHD (ATTENTION DEFICIT HYPERACTIVITY DISORDER), INATTENTIVE TYPE: ICD-10-CM

## 2022-05-20 RX ORDER — DEXTROAMPHETAMINE SACCHARATE, AMPHETAMINE ASPARTATE MONOHYDRATE, DEXTROAMPHETAMINE SULFATE AND AMPHETAMINE SULFATE 5; 5; 5; 5 MG/1; MG/1; MG/1; MG/1
20 CAPSULE, EXTENDED RELEASE ORAL EVERY MORNING
Qty: 30 CAPSULE | Refills: 0 | Status: SHIPPED | OUTPATIENT
Start: 2022-05-20 | End: 2022-06-19

## 2022-06-07 ENCOUNTER — TELEMEDICINE (OUTPATIENT)
Dept: PSYCHIATRY | Facility: CLINIC | Age: 37
End: 2022-06-07

## 2022-06-07 DIAGNOSIS — F43.10 POST TRAUMATIC STRESS DISORDER (PTSD): ICD-10-CM

## 2022-06-07 DIAGNOSIS — F51.05 INSOMNIA DUE TO MENTAL DISORDER: ICD-10-CM

## 2022-06-07 DIAGNOSIS — F33.1 MAJOR DEPRESSIVE DISORDER, RECURRENT EPISODE, MODERATE: ICD-10-CM

## 2022-06-07 DIAGNOSIS — F41.1 GENERALIZED ANXIETY DISORDER: ICD-10-CM

## 2022-06-07 DIAGNOSIS — F90.0 ADHD (ATTENTION DEFICIT HYPERACTIVITY DISORDER), INATTENTIVE TYPE: Primary | ICD-10-CM

## 2022-06-07 PROCEDURE — 99214 OFFICE O/P EST MOD 30 MIN: CPT | Performed by: NURSE PRACTITIONER

## 2022-06-07 NOTE — PROGRESS NOTES
Subjective   Devendra Rodriguez is a 36 y.o. male who presents today for follow up. Mode of visit: Video  Location of provider: [address], [place] [address].  Location of patient: Home  Does the patient consent to use a video/audio connection for your medical care today? Yes  The visit included audio and video interaction. No technical issues occurred during this visit.        Chief Complaint:  Depression, anxiety, attention issues    History of Present Illness: Depression over the past month- good  Sleep- good  Interest- Denies anhedonia  Guilt- Denies  Energy- good  Concentration- denies  Appetite- good  Psychomotor retardation/agitation- Denies  Suicidal thoughts or hopelessness- denies hopelessness, si or hi.     Anxiety over the past month- good  Anxious, nervous or worried on most days about a number of events or activities- denies  No control over worries- denies  Irritability- denies  Concentration- see above  Sleep- see above  Restlessness- denies  Tension in muscles- endorses    PTSD: Denies current symptoms of PTSD.     ADHD: has had issues picking up adderall due to to shortage at pharmacy. When taking adderall, focus and concentration has been better. Completing tasks easier at home and work. Denies other symptoms.     10/12/21 ADHD: Patient reports he started having issues with focus in 2014.  Endorses forgetfulness.  Difficulty following conversations.  Difficulty completing tasks.  Symptoms are present at home and at work.  No family history of ADHD diagnosis, but states his dad and brother probably have ADHD.  Patient had ADHD testing on September 29, 2021 through Kaden Barajas, a licensed clinical psychologist.  Testing results found that the patient met criteria for adult attention deficit disorder with hyperactivity.  Symptoms included distractibility, organizational ability, short-term memory, avoidance, restlessness nest, high intensity and difficulties with  executive functioning.  On the adult ADHD self-report scale symptom checklist, patient reported chronic difficulties with organizing tasks, remembering appointments or obligations, avoiding difficult task, continual fidgeting or squirming and feeling overly active.  No indications of drug-seeking behavior.  Acknowledged the presence of 22 of the 24 listed symptoms of ADHD in the DSM-V.    Psychiatric Review of Systems: Patient denies any current or previous hallucinations/delusions, paranoia, manic symptoms.     PHQ-9 Depression Screening  PHQ-9 Total Score:      Little interest or pleasure in doing things?     Feeling down, depressed, or hopeless?     Trouble falling or staying asleep, or sleeping too much?     Feeling tired or having little energy?     Poor appetite or overeating?     Feeling bad about yourself - or that you are a failure or have let yourself or your family down?     Trouble concentrating on things, such as reading the newspaper or watching television?     Moving or speaking so slowly that other people could have noticed? Or the opposite - being so fidgety or restless that you have been moving around a lot more than usual?     Thoughts that you would be better off dead, or of hurting yourself in some way?     PHQ-9 Total Score       WILLEM-7         Past Surgical History:  Past Surgical History:   Procedure Laterality Date   • ENDOSCOPY  01/23/2017    DR. ROBERTS   • EXCISION LESION N/A 4/15/2022    Procedure: EXCISION LESION;  Surgeon: Joycelyn Salinas MD;  Location: [facility];  Service: General;  Laterality: N/A;       Problem List:  Patient Active Problem List   Diagnosis   • Anxiety   • Broken bones   • Degeneration of lumbar intervertebral disc   • Depressive disorder   • Gastric reflux   • Gastroesophageal reflux disease   • Low back pain   • Lipoma of torso   • Sebaceous cyst       Allergy:   No Known Allergies     Discontinued Medications:  Medications Discontinued During This  Encounter   Medication Reason   • HYDROcodone-acetaminophen (NORCO) 5-325 MG per tablet *Therapy completed       Current Medications:   Current Outpatient Medications   Medication Sig Dispense Refill   • amphetamine-dextroamphetamine XR (Adderall XR) 20 MG 24 hr capsule Take 1 capsule by mouth Every Morning for 30 days 30 capsule 0     No current facility-administered medications for this visit.       Past Medical History:  Past Medical History:   Diagnosis Date   • ADHD (attention deficit hyperactivity disorder)    • Anxiety    • Benign essential hypertension     NO MEDS NEEDED   • Cyst of skin    • GERD (gastroesophageal reflux disease)    • Head injury    • PTSD (post-traumatic stress disorder)    • Suicide attempt (HCC)        Past Psychiatric History:  Began Treatment: 2016  Diagnoses: Depression, anxiety, PTSD  Psychiatrist: Denies  Therapist: Reports seeing a therapist at [facility] on and off for 5 to 6 years, last seeing them around 2019.  Admission History: [facility] around 2016  Medication Trials: Multiple antidepressants, patient unable to identify medication names at this time.  Self Harm: Denies  Suicide Attempts: Denies    Substance Abuse History:   Types: Reports using cocaine from 2004 until 2006.  Patient reports using marijuana once or twice since 2006.  Withdrawal Symptoms: Denies  Longest Period Sober: Since 2006  AA: Denies    Social History:  Martial Status:  x1  Employed: Works at [employer] in [place] for the past 2 years  Kids: 2 children, ages 10 and 6  House: Lives by self   History: Denies    Social History     Socioeconomic History   • Marital status:    Tobacco Use   • Smoking status: Never Smoker   • Smokeless tobacco: Current User     Types: Snuff   • Tobacco comment: INST PER ANESTHESIA PROTOCOL   Vaping Use   • Vaping Use: Never used   Substance and Sexual Activity   • Alcohol use: Yes     Alcohol/week: 4.0 standard drinks     Types:  "4 Cans of beer per week     Comment: DRINKS 8-14 DRINKS PER WEEK   • Drug use: Never   • Sexual activity: Defer       Family History:   Suicide Attempts: Denies  Suicide Completions: Denies      Family History   Problem Relation Age of Onset   • Dementia Paternal Grandfather    • Malig Hyperthermia Neg Hx        Access to Firearms: Denies    Mental Status Exam:     Appearance: good eye contact, normal street clothes, groomed, sitting in chair   Behavior: pleasant and cooperative  Motor: no abnormal  Speech: normal rhythm, rate, volume, tone, not hyperverbal, not pressured, normal prosidy  Mood: \" Better \"  Affect: euthymic  Thought Content: negative suicidal ideations, negative homicidal ideations, negative obsessions  Perceptions: negative auditory hallucinations, negative visual hallucinations, negative delusions, negative paranoia  Thought Process: goal directed, linear  Insight/Judgement: fair/fair  Cognition: grossly intact  Attention: intact  Orientation: person, place, time and situation  Memory: intact    Review of Systems:     Constitutional: Denies fatigue, night sweats  Eyes: Denies double vision, blurred vision  HENT: Denies vertigo, recent head injury  Cardiovascular: Denies chest pain, irregular heartbeats  Respiratory: Denies productive cough, shortness of breath  Gastrointestinal: Denies nausea, vomiting  Genitourinary: Denies dysuria, urinary retention  Integument: Denies hair growth change, new skin lesions  Neurologic: Denies altered mental status, seizures  Musculoskeletal: Denies joint swelling, limitation of motion  Endocrine: Denies cold intolerance, heat intolerance  Psychiatric: Admits anxiety, depression, attention issues. Denies hussain, post-traumatic stress disorder, obsessive compulsive disorder, psychosis.   Allergic-immunologic: Denies frequent illnesses      Vital Signs:   There were no vitals taken for this visit.     Lab Results:   Admission on 04/15/2022, Discharged on 04/15/2022 "   Component Date Value Ref Range Status   • Case Report 04/15/2022    Final                    Value:Surgical Pathology Report                         Case: [id number]                                  Authorizing Provider:  Joycelyn Salinas MD    Collected:           04/15/2022 10:22 AM          Ordering Location:     [facility]  Received:            04/15/2022 11:00 AM                                 OR                                                                           Pathologist:           Jennifer David MD                                                     Specimen:    Back, Lower, back mass (short stitch superior, long stitch lateral)                       • Clinical Information 04/15/2022    Final    This result contains rich text formatting which cannot be displayed here.   • Final Diagnosis 04/15/2022    Final                    Value:This result contains rich text formatting which cannot be displayed here.   • Gross Description 04/15/2022    Final                    Value:This result contains rich text formatting which cannot be displayed here.   • Microscopic Description 04/15/2022    Final    This result contains rich text formatting which cannot be displayed here.   Lab on 04/11/2022   Component Date Value Ref Range Status   • COVID19 04/12/2022 Not Detected  Not Detected - Ref. Range Final   Office Visit on 07/01/2021   Component Date Value Ref Range Status   • COVID19 07/01/2021 Not Detected  Not Detected - Ref. Range Final       EKG Results:  No orders to display       Imaging Results:  No Images in the past 120 days found..      Assessment & Plan   Diagnoses and all orders for this visit:    1. ADHD (attention deficit hyperactivity disorder), inattentive type (Primary)    2. Major depressive disorder, recurrent episode, moderate (HCC)    3. Generalized anxiety disorder    4. Post traumatic stress disorder (PTSD)    5. Insomnia due to mental disorder      Continue  adderall to target adhd symptoms. Has tolerated medication well with no side effects. 5 minutes of supportive psychotherapy with goal to strengthen defenses, promote problems solving, restore adaptive functioning and provide symptom relief. The therapeutic alliance was strengthened to encourage the patient to express their thoughts and feelings. Esteem building was enhanced through praise, reassurance, normalizing and encouragement. Coping skills were enhanced to build distress tolerance skills and emotional regulation. Patient given education on medication side effects, diagnosis/illness and relapse symptoms. Plan to continue supportive psychotherapy in next appointment to provide symptom relief. 12 weeks    10/12/21 Patient had testing for ADHD performed on September 29, 2021 by Pedro Barajas, a licensed clinical psychologist.  Records of the testing were sent to this office and reviewed by me.  Per testing results, patient met criteria for adult attention deficit disorder with hyperactivity.      Visit Diagnoses:    ICD-10-CM ICD-9-CM   1. ADHD (attention deficit hyperactivity disorder), inattentive type  F90.0 314.00   2. Major depressive disorder, recurrent episode, moderate (HCC)  F33.1 296.32   3. Generalized anxiety disorder  F41.1 300.02   4. Post traumatic stress disorder (PTSD)  F43.10 309.81   5. Insomnia due to mental disorder  F51.05 300.9     327.02       PLAN:  1. Safety: No acute safety concerns.   2. Referral: N/a  3. Risk Assessment: Risk of self-harm acutely is moderate.  Risk factors include anxiety disorder, mood disorder, and recent psychosocial stressors (pandemic). Protective factors include no family history, denies access to guns/weapons, no present SI, no history of suicide attempts or self-harm in the past, minimal AODA, healthcare seeking, future orientation, willingness to engage in care.  Risk of self-harm chronically is also moderate, but could be further elevated in the event of  treatment noncompliance and/or AODA.  4. Medications: CONTINUE adderall xr 20mg po qday to target ADHD symptoms. Risks, benefits, side effects discussed with patient including elevated heart rate, elevated blood pressure, irritability, insomnia, sexual dysfunction, appetite suppressing properties, psychosis.  After discussion of these risks and benefits, the patient voiced understanding and agreed to proceed. Controlled substances consent verbally signed. UDS and Shoshana ordered.  5. Labs/studies: UDS 10-19-21 negative; UDS  6. Follow-up: 12 weeks  Patient screened positive for depression based on a PHQ-9 score of  on . Follow-up recommendations include: Suicide Risk Assessment performed.         TREATMENT PLAN/GOALS: Continue supportive psychotherapy efforts and medications as indicated. Treatment and medication options discussed during today's visit. Patient ackowledged and verbally consented to continue with current treatment plan and was educated on the importance of compliance with treatment and follow-up appointments.      MEDICATION ISSUES:  SHOSHANA reviewed as expected.  Discussed medication options and treatment plan of prescribed medication as well as the risks, benefits, and side effects including potential falls, possible impaired driving and metabolic adversities among others. Patient is agreeable to call the office with any worsening of symptoms or onset of side effects. Patient is agreeable to call 911 or go to the nearest ER should he/she begin having SI/HI. No medication side effects or related complaints today.     MEDS ORDERED DURING VISIT:  No orders of the defined types were placed in this encounter.      Return in about 12 weeks (around 8/30/2022) for Next scheduled follow up.         This document has been electronically signed by HERLINDA Webster  June 7, 2022 09:26 EDT      Part of this note may be an electronic transcription/translation of spoken language to printed text using the Dragon  Dictation System.

## 2022-09-07 ENCOUNTER — TELEMEDICINE (OUTPATIENT)
Dept: PSYCHIATRY | Facility: CLINIC | Age: 37
End: 2022-09-07

## 2022-09-07 VITALS — BODY MASS INDEX: 22.66 KG/M2 | HEIGHT: 68 IN

## 2022-09-07 DIAGNOSIS — F43.10 POST TRAUMATIC STRESS DISORDER (PTSD): ICD-10-CM

## 2022-09-07 DIAGNOSIS — F90.0 ADHD (ATTENTION DEFICIT HYPERACTIVITY DISORDER), INATTENTIVE TYPE: ICD-10-CM

## 2022-09-07 DIAGNOSIS — F51.05 INSOMNIA DUE TO MENTAL DISORDER: ICD-10-CM

## 2022-09-07 DIAGNOSIS — F33.1 MAJOR DEPRESSIVE DISORDER, RECURRENT EPISODE, MODERATE: Primary | ICD-10-CM

## 2022-09-07 DIAGNOSIS — F41.1 GENERALIZED ANXIETY DISORDER: ICD-10-CM

## 2022-09-07 PROCEDURE — 99214 OFFICE O/P EST MOD 30 MIN: CPT | Performed by: NURSE PRACTITIONER

## 2022-09-07 NOTE — TREATMENT PLAN
Multi-Disciplinary Problems (from Behavioral Health Treatment Plan)    Active Problems     Problem: ADHD (Adult)  Start Date: 09/07/22    Problem Details: The patient self-scales this problem as a 5 with 10 being the worst.      Goal Priority Start Date Expected End Date End Date    Patient will sustain attention and concentration to complete chores, and work responsibilites and increase positive interaction in all relationships. -- 09/07/22 -- --    Goal Details: Progress toward goal:  Not appropriate to rate progress toward goal since this is the initial treatment plan.      Goal Intervention Frequency Start Date End Date    Assist patient in setting responsible goals and breaking down large tasks. Weekly 09/07/22 --    Intervention Details: Duration of treatment until until remission of symptoms.      Goal Intervention Frequency Start Date End Date    Assist patient in using self monitoring checklist to improve attention, work performance, and social skills. Weekly 09/07/22 --    Intervention Details: Duration of treatment until until remission of symptoms.                  Reviewed By     Wesly Kiser APRN 09/07/22 0927                 I have discussed and reviewed this treatment plan with the patient.

## 2022-09-07 NOTE — PROGRESS NOTES
Subjective   Devendra Rodriguez is a 36 y.o. male who presents today for follow up. Mode of visit: Video  Location of provider: [address], [place] [address].  Location of patient: Home  Does the patient consent to use a video/audio connection for your medical care today? Yes  The visit included audio and video interaction. No technical issues occurred during this visit.        Chief Complaint:  Depression, anxiety, attention issues    History of Present Illness: Depression over the past month- increase in stress- financial  Sleep- good  Interest- Denies anhedonia  Guilt- Denies  Energy- good  Concentration- better with adderall  Appetite- good  Psychomotor retardation/agitation- Denies  Suicidal thoughts or hopelessness- denies hopelessness, si or hi.     Anxiety over the past month- has been high   Anxious, nervous or worried on most days about a number of events or activities- excessive worries  No control over worries- difficult to manage- working on positive coping skills  Irritability- denies  Concentration- see above  Sleep- see above  Restlessness- denies  Tension in muscles- endorses    PTSD: Denies current symptoms of PTSD.     ADHD: has had issues picking up adderall. When taking adderall, focus and concentration has been better. Completing tasks easier at home and work. Denies other symptoms.     10/12/21 ADHD: Patient reports he started having issues with focus in 2014.  Endorses forgetfulness.  Difficulty following conversations.  Difficulty completing tasks.  Symptoms are present at home and at work.  No family history of ADHD diagnosis, but states his dad and brother probably have ADHD.  Patient had ADHD testing on September 29, 2021 through Kaden Barajas, a licensed clinical psychologist.  Testing results found that the patient met criteria for adult attention deficit disorder with hyperactivity.  Symptoms included distractibility, organizational ability, short-term  memory, avoidance, restlessness nest, high intensity and difficulties with executive functioning.  On the adult ADHD self-report scale symptom checklist, patient reported chronic difficulties with organizing tasks, remembering appointments or obligations, avoiding difficult task, continual fidgeting or squirming and feeling overly active.  No indications of drug-seeking behavior.  Acknowledged the presence of 22 of the 24 listed symptoms of ADHD in the DSM-V.    Psychiatric Review of Systems: Patient denies any current or previous hallucinations/delusions, paranoia, manic symptoms.     PHQ-9 Depression Screening  PHQ-9 Total Score:      Little interest or pleasure in doing things?     Feeling down, depressed, or hopeless?     Trouble falling or staying asleep, or sleeping too much?     Feeling tired or having little energy?     Poor appetite or overeating?     Feeling bad about yourself - or that you are a failure or have let yourself or your family down?     Trouble concentrating on things, such as reading the newspaper or watching television?     Moving or speaking so slowly that other people could have noticed? Or the opposite - being so fidgety or restless that you have been moving around a lot more than usual?     Thoughts that you would be better off dead, or of hurting yourself in some way?     PHQ-9 Total Score       WILLEM-7  Feeling nervous, anxious or on edge: Nearly every day  Not being able to stop or control worrying: Nearly every day  Worrying too much about different things: Nearly every day  Trouble Relaxing: Nearly every day  Being so restless that it is hard to sit still: Nearly every day  Feeling afraid as if something awful might happen: Several days  Becoming easily annoyed or irritable: Nearly every day  WILLEM 7 Total Score: 19  If you checked any problems, how difficult have these problems made it for you to do your work, take care of things at home, or get along with other people: Somewhat  difficult      Past Surgical History:  Past Surgical History:   Procedure Laterality Date   • ENDOSCOPY  01/23/2017    DR. ROBERTS   • EXCISION LESION N/A 4/15/2022    Procedure: EXCISION LESION;  Surgeon: Joycelyn Salinas MD;  [place] [facility] [place] [facility] [place]  Service: General;  Laterality: N/A;       Problem List:  Patient Active Problem List   Diagnosis   • Anxiety   • Broken bones   • Degeneration of lumbar intervertebral disc   • Depressive disorder   • Gastric reflux   • Gastroesophageal reflux disease   • Low back pain   • Lipoma of torso   • Sebaceous cyst       Allergy:   No Known Allergies     Discontinued Medications:  There are no discontinued medications.    Current Medications:   No current outpatient medications on file.     No current facility-administered medications for this visit.       Past Medical History:  Past Medical History:   Diagnosis Date   • ADHD (attention deficit hyperactivity disorder)    • Anxiety    • Benign essential hypertension     NO MEDS NEEDED   • Cyst of skin    • GERD (gastroesophageal reflux disease)    • Head injury    • PTSD (post-traumatic stress disorder)    • Suicide attempt (HCC)        Past Psychiatric History:  Began Treatment: 2016  Diagnoses: Depression, anxiety, PTSD  Psychiatrist: Denies  Therapist: Reports seeing a therapist at [facility] on and off for 5 to 6 years, last seeing them around 2019.  Admission History: [facility] around 2016  Medication Trials: Multiple antidepressants, patient unable to identify medication names at this time.  Self Harm: Denies  Suicide Attempts: Denies    Substance Abuse History:   Types: Reports using cocaine from 2004 until 2006.  Patient reports using marijuana once or twice since 2006.  Withdrawal Symptoms: Denies  Longest Period Sober: Since 2006  AA: Denies    Social History:  Martial Status:  x1  Employed: Works at [employer] in [place] for the past 2 years  Kids: 2 children, ages 10 and  "6  House: Lives by self   History: Denies    Social History     Socioeconomic History   • Marital status:    Tobacco Use   • Smoking status: Never Smoker   • Smokeless tobacco: Current User     Types: Snuff   • Tobacco comment: INST PER ANESTHESIA PROTOCOL   Vaping Use   • Vaping Use: Never used   Substance and Sexual Activity   • Alcohol use: Yes     Alcohol/week: 4.0 standard drinks     Types: 4 Cans of beer per week     Comment: DRINKS 8-14 DRINKS PER WEEK   • Drug use: Never   • Sexual activity: Defer       Family History:   Suicide Attempts: Denies  Suicide Completions: Denies      Family History   Problem Relation Age of Onset   • Dementia Paternal Grandfather    • Malig Hyperthermia Neg Hx        Access to Firearms: Denies    Mental Status Exam:     Appearance: good eye contact, normal street clothes, groomed, sitting in chair   Behavior: pleasant and cooperative  Motor: no abnormal  Speech: normal rhythm, rate, volume, tone, not hyperverbal, not pressured, normal prosidy  Mood: \" Better \"  Affect: euthymic  Thought Content: negative suicidal ideations, negative homicidal ideations, negative obsessions  Perceptions: negative auditory hallucinations, negative visual hallucinations, negative delusions, negative paranoia  Thought Process: goal directed, linear  Insight/Judgement: fair/fair  Cognition: grossly intact  Attention: intact  Orientation: person, place, time and situation  Memory: intact    Review of Systems:     Constitutional: Denies fatigue, night sweats  Eyes: Denies double vision, blurred vision  HENT: Denies vertigo, recent head injury  Cardiovascular: Denies chest pain, irregular heartbeats  Respiratory: Denies productive cough, shortness of breath  Gastrointestinal: Denies nausea, vomiting  Genitourinary: Denies dysuria, urinary retention  Integument: Denies hair growth change, new skin lesions  Neurologic: Denies altered mental status, seizures  Musculoskeletal: Denies joint " "swelling, limitation of motion  Endocrine: Denies cold intolerance, heat intolerance  Psychiatric: Admits anxiety, depression, attention issues. Denies hussain, post-traumatic stress disorder, obsessive compulsive disorder, psychosis.   Allergic-immunologic: Denies frequent illnesses      Vital Signs:   Ht 172.7 cm (68\")   BMI 22.66 kg/m²      Lab Results:   Admission on 04/15/2022, Discharged on 04/15/2022   Component Date Value Ref Range Status   • Case Report 04/15/2022    Final                    Value:Surgical Pathology Report                         Case: [id number]                                  Authorizing Provider:  Joycelyn Salinas MD    Collected:           04/15/2022 10:22 AM          Ordering Location:     [facility]  Received:            04/15/2022 11:00 AM                                 OR                                                                           Pathologist:           Jennifer David MD                                                     Specimen:    Back, Lower, back mass (short stitch superior, long stitch lateral)                       • Clinical Information 04/15/2022    Final    This result contains rich text formatting which cannot be displayed here.   • Final Diagnosis 04/15/2022    Final                    Value:This result contains rich text formatting which cannot be displayed here.   • Gross Description 04/15/2022    Final                    Value:This result contains rich text formatting which cannot be displayed here.   • Microscopic Description 04/15/2022    Final    This result contains rich text formatting which cannot be displayed here.   Lab on 04/11/2022   Component Date Value Ref Range Status   • COVID19 04/12/2022 Not Detected  Not Detected - Ref. Range Final       EKG Results:  No orders to display       Imaging Results:  No Images in the past 120 days found..      Assessment & Plan   Diagnoses and all orders for this visit:    1. Major " depressive disorder, recurrent episode, moderate (HCC) (Primary)    2. Generalized anxiety disorder    3. Post traumatic stress disorder (PTSD)    4. ADHD (attention deficit hyperactivity disorder), inattentive type    5. Insomnia due to mental disorder      Has had issues picking up adderall due to financial stress. Currently working on financial situation with bank. Continue adderall to target adhd symptoms. Has tolerated medication well with no side effects. 5 minutes of supportive psychotherapy with goal to strengthen defenses, promote problems solving, restore adaptive functioning and provide symptom relief. The therapeutic alliance was strengthened to encourage the patient to express their thoughts and feelings. Esteem building was enhanced through praise, reassurance, normalizing and encouragement. Coping skills were enhanced to build distress tolerance skills and emotional regulation. Patient given education on medication side effects, diagnosis/illness and relapse symptoms. Plan to continue supportive psychotherapy in next appointment to provide symptom relief. 12 weeks    10/12/21 Patient had testing for ADHD performed on September 29, 2021 by Pedro Barajas, a licensed clinical psychologist.  Records of the testing were sent to this office and reviewed by me.  Per testing results, patient met criteria for adult attention deficit disorder with hyperactivity.      Visit Diagnoses:    ICD-10-CM ICD-9-CM   1. Major depressive disorder, recurrent episode, moderate (HCC)  F33.1 296.32   2. Generalized anxiety disorder  F41.1 300.02   3. Post traumatic stress disorder (PTSD)  F43.10 309.81   4. ADHD (attention deficit hyperactivity disorder), inattentive type  F90.0 314.00   5. Insomnia due to mental disorder  F51.05 300.9     327.02       PLAN:  1. Safety: No acute safety concerns.   2. Referral: N/a  3. Risk Assessment: Risk of self-harm acutely is moderate.  Risk factors include anxiety disorder, mood  disorder, and recent psychosocial stressors (pandemic). Protective factors include no family history, denies access to guns/weapons, no present SI, no history of suicide attempts or self-harm in the past, minimal AODA, healthcare seeking, future orientation, willingness to engage in care.  Risk of self-harm chronically is also moderate, but could be further elevated in the event of treatment noncompliance and/or AODA.  4. Medications: CONTINUE adderall xr 20mg po qday to target ADHD symptoms. Risks, benefits, side effects discussed with patient including elevated heart rate, elevated blood pressure, irritability, insomnia, sexual dysfunction, appetite suppressing properties, psychosis.  After discussion of these risks and benefits, the patient voiced understanding and agreed to proceed. Controlled substances consent verbally signed. UDS and Shoshana ordered.  5. Labs/studies: UDS 10-19-21 negative; UDS  6. Follow-up: 12 weeks  Patient screened positive for depression based on a PHQ-9 score of  on . Follow-up recommendations include: Suicide Risk Assessment performed.         TREATMENT PLAN/GOALS: Continue supportive psychotherapy efforts and medications as indicated. Treatment and medication options discussed during today's visit. Patient ackowledged and verbally consented to continue with current treatment plan and was educated on the importance of compliance with treatment and follow-up appointments.      MEDICATION ISSUES:  SHOSHANA reviewed as expected.  Discussed medication options and treatment plan of prescribed medication as well as the risks, benefits, and side effects including potential falls, possible impaired driving and metabolic adversities among others. Patient is agreeable to call the office with any worsening of symptoms or onset of side effects. Patient is agreeable to call 911 or go to the nearest ER should he/she begin having SI/HI. No medication side effects or related complaints today.     MEDS  ORDERED DURING VISIT:  No orders of the defined types were placed in this encounter.      Return in about 12 weeks (around 11/30/2022) for Next scheduled follow up.         This document has been electronically signed by HERLINDA Webster  September 7, 2022 09:26 EDT      Part of this note may be an electronic transcription/translation of spoken language to printed text using the Dragon Dictation System.

## 2022-12-12 ENCOUNTER — OFFICE VISIT (OUTPATIENT)
Dept: FAMILY MEDICINE CLINIC | Facility: CLINIC | Age: 37
End: 2022-12-12

## 2022-12-12 VITALS
SYSTOLIC BLOOD PRESSURE: 140 MMHG | HEART RATE: 85 BPM | WEIGHT: 161 LBS | BODY MASS INDEX: 24.48 KG/M2 | OXYGEN SATURATION: 98 % | DIASTOLIC BLOOD PRESSURE: 90 MMHG | TEMPERATURE: 96.5 F

## 2022-12-12 DIAGNOSIS — R20.0 PAIN AND NUMBNESS OF UPPER EXTREMITY: Primary | ICD-10-CM

## 2022-12-12 DIAGNOSIS — M79.603 PAIN AND NUMBNESS OF UPPER EXTREMITY: Primary | ICD-10-CM

## 2022-12-12 PROCEDURE — 99213 OFFICE O/P EST LOW 20 MIN: CPT | Performed by: NURSE PRACTITIONER

## 2022-12-12 RX ORDER — TIZANIDINE 4 MG/1
4 TABLET ORAL NIGHTLY PRN
Qty: 30 TABLET | Refills: 0 | Status: SHIPPED | OUTPATIENT
Start: 2022-12-12

## 2022-12-12 RX ORDER — METHYLPREDNISOLONE 4 MG/1
TABLET ORAL
Qty: 1 EACH | Refills: 0 | Status: SHIPPED | OUTPATIENT
Start: 2022-12-12

## 2022-12-12 NOTE — PROGRESS NOTES
Chief Complaint  Numbness (Bilateral arm numbness )    Subjective            Devendra Rodriguez presents to [facility]  History of Present Illness     Starr comes into the office today concerned for bilateral upper extremity pain, numbness, and tingling.  He states that this has been ongoing for several months, but seemingly worse in the past few weeks.  He has tried several things in an attempt to alleviate his symptoms, but nothing has been efficacious.  He states that at first he thought it was his mattress, so he got a new mattress.  That did not change his symptoms.  He then changed sleep positions such that he stopped sleeping in his bed and started sleeping in an upright position in a recliner.  That did seem to alleviate some of his symptoms, but it did not resolve them.  He has also tried taking ibuprofen and naproxen.  He states that the anti-inflammatories do appear to ease his symptoms but he is still waking up with pain, numbness, and tingling.  It is worse in the morning and tends to dissipate by noon.  He states the more active he is it seems to go away.  He denies any midline neck pain.  He denies any pain on movement of his neck.  He does have a history of lumbar degenerative disc disease, as well as lumbar stenosis/radiculopathy per his report.    He is a  and works for [employer] - he is also building a new home, doing all of the labor himself.     Past Medical History:   Diagnosis Date   • ADHD (attention deficit hyperactivity disorder)    • Anxiety    • Benign essential hypertension     NO MEDS NEEDED   • Cyst of skin    • GERD (gastroesophageal reflux disease)    • Head injury    • PTSD (post-traumatic stress disorder)    • Suicide attempt (HCC)        No Known Allergies     Past Surgical History:   Procedure Laterality Date   • ENDOSCOPY  01/23/2017    DR. ROBERTS   • EXCISION LESION N/A 4/15/2022    Procedure: EXCISION LESION;  Surgeon:  Joycelyn Salinas MD;  [place] [facility] [place] [facility] [place]  Service: General;  Laterality: N/A;        Social History     Tobacco Use   • Smoking status: Never   • Smokeless tobacco: Current     Types: Snuff   • Tobacco comments:     INST PER ANESTHESIA PROTOCOL   Vaping Use   • Vaping Use: Never used   Substance Use Topics   • Alcohol use: Yes     Alcohol/week: 4.0 standard drinks     Types: 4 Cans of beer per week     Comment: DRINKS 8-14 DRINKS PER WEEK   • Drug use: Never       Family History   Problem Relation Age of Onset   • Dementia Paternal Grandfather    • Malig Hyperthermia Neg Hx         Health Maintenance Due   Topic Date Due   • HEPATITIS C SCREENING  Never done        No current outpatient medications on file prior to visit.     No current facility-administered medications on file prior to visit.       Immunization History   Administered Date(s) Administered   • Flu Vaccine Split Quad 10/09/2018   • Hep B, Adolescent or Pediatric 07/18/1996   • Hepatitis A 04/24/2018   • Hepatitis B 08/19/1996, 04/10/1997   • Td 07/12/2000   • Tdap 08/26/2019   • Varicella 04/10/1997       Review of Systems     Objective     /90   Pulse 85   Temp 96.5 °F (35.8 °C)   Wt 73 kg (161 lb)   SpO2 98%   BMI 24.48 kg/m²       Physical Exam  Vitals reviewed.   Constitutional:       General: He is not in acute distress.     Appearance: Normal appearance. He is well-developed and normal weight.   HENT:      Head: Normocephalic and atraumatic.   Eyes:      General: No scleral icterus.     Extraocular Movements: Extraocular movements intact.      Conjunctiva/sclera: Conjunctivae normal.   Neck:      Thyroid: No thyroid mass, thyromegaly or thyroid tenderness.      Trachea: Trachea normal.   Cardiovascular:      Rate and Rhythm: Normal rate and regular rhythm.      Pulses: Normal pulses.      Heart sounds: No murmur heard.  Pulmonary:      Effort: Pulmonary effort is normal. No respiratory distress.      Breath sounds: Normal  breath sounds. No wheezing, rhonchi or rales.   Musculoskeletal:         General: Normal range of motion.      Right shoulder: No swelling, deformity, tenderness, bony tenderness or crepitus. Normal range of motion. Normal strength.      Left shoulder: No swelling, deformity, tenderness, bony tenderness or crepitus. Normal range of motion. Normal strength.      Cervical back: Normal range of motion and neck supple. No signs of trauma or crepitus. No pain with movement, spinous process tenderness or muscular tenderness. Normal range of motion.      Right lower leg: No edema.      Left lower leg: No edema.   Lymphadenopathy:      Cervical: No cervical adenopathy.   Skin:     General: Skin is warm and dry.   Neurological:      Mental Status: He is alert and oriented to person, place, and time.   Psychiatric:         Mood and Affect: Mood and affect normal.         Behavior: Behavior normal.         Thought Content: Thought content normal.         Judgment: Judgment normal.         Result Review :     The following data was reviewed by: HERLINDA Alfredo on 12/12/2022:        Data reviewed: Radiologic studies :   XR Spine Cervical 2 or 3 View (12/12/2022 14:32)           Assessment and Plan      Diagnoses and all orders for this visit:    1. Pain and numbness of upper extremity (Primary)  -     XR Spine Cervical 2 or 3 View  -     methylPREDNISolone (MEDROL) 4 MG dose pack; Take as directed on package instructions.  Dispense: 1 each; Refill: 0  -     tiZANidine (ZANAFLEX) 4 MG tablet; Take 1 tablet by mouth At Night As Needed for Muscle Spasms.  Dispense: 30 tablet; Refill: 0            Follow Up     Return if symptoms worsen or fail to improve.     C-spine x-ray does not show any acute abnormal findings.  I am going to give him a steroid pack and muscle relaxer to take at nighttime to see if this will alleviate his symptoms.  He has been working long hours at work, in addition to working on his own home in his  off time.  Suspect musculoskeletal etiology; however, if not improving with a steroid pack and muscle relaxers, we did discuss labs to include B12/folate and ferritin with iron profile.  He has also considered seeing a chiropractor to see if that will provide relief.  If symptomatic relief is not achieved with medication and manipulation, and labs are normal, then I did suggest we consider an MRI to further assess.  He is agreeable to the plan.    Patient was given instructions and counseling regarding his condition or for health maintenance advice. Please see specific information pulled into the AVS if appropriate.     Devendra Rodriguez  reports that he has never smoked. His smokeless tobacco use includes snuff.

## 2024-08-12 ENCOUNTER — OFFICE VISIT (OUTPATIENT)
Dept: FAMILY MEDICINE CLINIC | Facility: CLINIC | Age: 39
End: 2024-08-12
Payer: COMMERCIAL

## 2024-08-12 VITALS
WEIGHT: 151 LBS | OXYGEN SATURATION: 99 % | SYSTOLIC BLOOD PRESSURE: 144 MMHG | HEART RATE: 68 BPM | BODY MASS INDEX: 22.96 KG/M2 | DIASTOLIC BLOOD PRESSURE: 80 MMHG

## 2024-08-12 DIAGNOSIS — I25.10 CORONARY ARTERY CALCIFICATION SEEN ON CT SCAN: ICD-10-CM

## 2024-08-12 DIAGNOSIS — I10 ESSENTIAL HYPERTENSION: Primary | ICD-10-CM

## 2024-08-12 DIAGNOSIS — Z11.59 NEED FOR HEPATITIS C SCREENING TEST: ICD-10-CM

## 2024-08-12 PROCEDURE — 99214 OFFICE O/P EST MOD 30 MIN: CPT | Performed by: NURSE PRACTITIONER

## 2024-08-12 RX ORDER — CYCLOBENZAPRINE HCL 10 MG
TABLET ORAL
COMMUNITY
Start: 2024-07-30 | End: 2024-08-12

## 2024-08-12 RX ORDER — KETOROLAC TROMETHAMINE 10 MG/1
10 TABLET, FILM COATED ORAL 4 TIMES DAILY PRN
COMMUNITY
Start: 2024-07-30 | End: 2024-08-12

## 2024-08-12 RX ORDER — LISINOPRIL 20 MG/1
20 TABLET ORAL DAILY
Qty: 90 TABLET | Refills: 1 | Status: SHIPPED | OUTPATIENT
Start: 2024-08-12

## 2024-08-12 NOTE — PROGRESS NOTES
Chief Complaint  Hospital Follow Up Visit (From 07/29) and Hyperlipidemia    History of Present Illness  Devendra Rodriguez is a 38 y.o. male who presents to [facility] with a past medical history of    Past Medical History:   Diagnosis Date    ADHD (attention deficit hyperactivity disorder)     Anxiety     Benign essential hypertension     NO MEDS NEEDED    Cyst of skin     GERD (gastroesophageal reflux disease)     Head injury     PTSD (post-traumatic stress disorder)     Suicide attempt      Huber presents to the office today for ER follow-up.    He was recently seen at [facility] emergency room on 7/29/2024 following a fall from a ladder.  He was at home working on his garage door, when the door sprung open knocking him off of his ladder.  He was approximately 9 feet above the ground when he fell.  He had imaging to rule out fracture including an x-ray of his right wrist, and x-ray of the left shoulder, and a CT of the brain, cervical spine, and thorax.  All imaging was unremarkable showing no acute osseous abnormalities.  There was some dorsal soft tissue swelling on the right wrist x-ray.  Small polyp in the visualized right maxillary sinus on CT the brain.  CT of the thorax showed calcified granuloma in the left lower lobe, as well as calcified mediastinal and left hilar nodes.  Mild coronary artery calcification noted.  The emergency room physician recommended follow-up with PCP to assess lipid profile.    He has a history of hypertension.  Blood pressure is elevated on exam today.  Blood pressure also elevated when in the emergency room.  He used to take lisinopril 20 mg daily but has stopped use due to lack of follow-up.  He denies chest pain, palpitations, headaches, dizziness, or shortness of breath.  He does state that he can sometimes tell when his blood pressure is elevated.  His BMI is normal.  He is moderately physically active daily.  His job is  physically demanding, but he also labors a lot at home.  He is currently building his own home.  He does endorse using a lot of salt.  He also endorses poor dietary choices such as frequent fast food and convenience foods.    PHQ-2 Depression Screening  Little interest or pleasure in doing things? 0-->not at all   Feeling down, depressed, or hopeless? 0-->not at all   PHQ-2 Total Score 0     Objective   Vital Signs:   Vitals:    08/12/24 1452   BP: 144/80   Pulse: 68   SpO2: 99%   Weight: 68.5 kg (151 lb)     Body mass index is 22.96 kg/m².    Wt Readings from Last 3 Encounters:   08/12/24 68.5 kg (151 lb)   12/12/22 73 kg (161 lb)   04/25/22 67.6 kg (149 lb)     BP Readings from Last 3 Encounters:   08/12/24 144/80   12/12/22 140/90   04/15/22 (!) 139/105       Health Maintenance   Topic Date Due    HEPATITIS C SCREENING  Never done    ANNUAL PHYSICAL  Never done    COVID-19 Vaccine (1 - 2023-24 season) Never done    INFLUENZA VACCINE  08/01/2024    TDAP/TD VACCINES (3 - Td or Tdap) 08/26/2029    Pneumococcal Vaccine 0-64  Aged Out       Physical Exam  Vitals reviewed.   Constitutional:       General: He is not in acute distress.     Appearance: Normal appearance. He is well-developed and normal weight. He is not ill-appearing.   HENT:      Head: Normocephalic and atraumatic.   Eyes:      General: No scleral icterus.        Right eye: No discharge.         Left eye: No discharge.      Extraocular Movements: Extraocular movements intact.      Conjunctiva/sclera: Conjunctivae normal.   Neck:      Vascular: No carotid bruit.      Trachea: Trachea normal.   Cardiovascular:      Rate and Rhythm: Normal rate and regular rhythm.      Pulses: Normal pulses.      Heart sounds: No murmur heard.  Pulmonary:      Effort: Pulmonary effort is normal.      Breath sounds: Normal breath sounds.   Abdominal:      General: Bowel sounds are normal. There is no distension.      Palpations: Abdomen is soft. There is no mass.       Tenderness: There is no abdominal tenderness.   Musculoskeletal:         General: Normal range of motion.      Cervical back: Normal range of motion and neck supple. No tenderness.      Right lower leg: No edema.      Left lower leg: No edema.   Lymphadenopathy:      Cervical: No cervical adenopathy.   Skin:     General: Skin is warm and dry.   Neurological:      Mental Status: He is alert and oriented to person, place, and time.   Psychiatric:         Mood and Affect: Mood and affect normal.         Behavior: Behavior normal.         Thought Content: Thought content normal.         Judgment: Judgment normal.           Result Review :  The following data was reviewed by: HERLINDA Alfredo on 08/12/2024:    EMERGENCY DEPART/[facility] - SCAN - ER NOTES, [facility], 07/29/2024 (07/29/2024)     Procedures        Assessment and Plan   Diagnoses and all orders for this visit:    1. Essential hypertension (Primary)  -     lisinopril (PRINIVIL,ZESTRIL) 20 MG tablet; Take 1 tablet by mouth Daily.  Dispense: 90 tablet; Refill: 1  -     CBC Auto Differential; Future  -     Comprehensive Metabolic Panel; Future  -     Lipid Panel; Future  -     TSH+Free T4; Future  -     Microalbumin / Creatinine Urine Ratio - Urine, Clean Catch; Future  -     CT Cardiac Calcium Score Without Dye; Future    2. Coronary artery calcification seen on CT scan  -     Comprehensive Metabolic Panel; Future  -     Lipid Panel; Future  -     Apolipoprotein B; Future  -     Lipoprotein A (LPA); Future  -     High Sensitivity CRP; Future  -     CT Cardiac Calcium Score Without Dye; Future    3. Need for hepatitis C screening test  -     Hepatitis C Antibody; Future        BMI is within normal parameters. No other follow-up for BMI required.         FOLLOW UP  Return for follow up pending outcome of labs.    Counseled patient regarding adherence to medication regimen, as well as dietary restriction of sodium.  Work on improving diet.  Increase  fruits, vegetables, and whole grains.  Limit intake of fast foods, as well as foods that are high in fat/saturated fat.  Limit red meat, pork, and full fat dairy.    He was noted to have coronary artery calcifications on recent CT of the thorax.  Recommended that we obtain fasting labs to include CBC, CMP, lipid profile, and TSH.  We will also check urine microalbumin/creatinine ratio due to ongoing hypertension.  Reinitiate lisinopril 20 mg daily.  Check apolipoprotein B, lipoprotein a, high-sensitivity C-reactive protein, and CT cardiac Calc score.  Based on outcome of labs/imaging we have discussed statin therapy and/or cardiology referral.    Patient was given instructions and counseling regarding his condition or for health maintenance advice. Please see specific information pulled into the AVS if appropriate.       Dinorah Lezama, APRN  08/12/24  17:00 EDT    CURRENT & DISCONTINUED MEDICATIONS  Current Outpatient Medications   Medication Instructions    lisinopril (PRINIVIL,ZESTRIL) 20 mg, Oral, Daily       Medications Discontinued During This Encounter   Medication Reason    methylPREDNISolone (MEDROL) 4 MG dose pack *Therapy completed    tiZANidine (ZANAFLEX) 4 MG tablet *Therapy completed    cyclobenzaprine (FLEXERIL) 10 MG tablet *Therapy completed    ketorolac (TORADOL) 10 MG tablet *Therapy completed

## 2025-05-29 ENCOUNTER — OFFICE VISIT (OUTPATIENT)
Dept: FAMILY MEDICINE CLINIC | Facility: CLINIC | Age: 40
End: 2025-05-29
Payer: COMMERCIAL

## 2025-05-29 VITALS
HEART RATE: 86 BPM | TEMPERATURE: 98.6 F | DIASTOLIC BLOOD PRESSURE: 86 MMHG | OXYGEN SATURATION: 98 % | SYSTOLIC BLOOD PRESSURE: 134 MMHG | HEIGHT: 68 IN | WEIGHT: 161 LBS | BODY MASS INDEX: 24.4 KG/M2

## 2025-05-29 DIAGNOSIS — R53.82 CHRONIC FATIGUE: ICD-10-CM

## 2025-05-29 DIAGNOSIS — F33.1 MODERATE EPISODE OF RECURRENT MAJOR DEPRESSIVE DISORDER: ICD-10-CM

## 2025-05-29 DIAGNOSIS — I10 ESSENTIAL HYPERTENSION: Primary | ICD-10-CM

## 2025-05-29 DIAGNOSIS — Z13.6 ENCOUNTER FOR SCREENING FOR CARDIOVASCULAR DISORDERS: ICD-10-CM

## 2025-05-29 DIAGNOSIS — I25.10 CORONARY ARTERY CALCIFICATION SEEN ON CT SCAN: ICD-10-CM

## 2025-05-29 DIAGNOSIS — Z11.59 NEED FOR HEPATITIS C SCREENING TEST: ICD-10-CM

## 2025-05-29 PROCEDURE — 99214 OFFICE O/P EST MOD 30 MIN: CPT | Performed by: NURSE PRACTITIONER

## 2025-05-29 PROCEDURE — 93000 ELECTROCARDIOGRAM COMPLETE: CPT | Performed by: NURSE PRACTITIONER

## 2025-05-29 RX ORDER — DULOXETIN HYDROCHLORIDE 20 MG/1
20 CAPSULE, DELAYED RELEASE ORAL DAILY
Qty: 30 CAPSULE | Refills: 0 | Status: SHIPPED | OUTPATIENT
Start: 2025-05-29

## 2025-05-29 RX ORDER — AMLODIPINE BESYLATE 2.5 MG/1
2.5 TABLET ORAL DAILY
Qty: 30 TABLET | Refills: 0 | Status: SHIPPED | OUTPATIENT
Start: 2025-05-29

## 2025-05-29 NOTE — PROGRESS NOTES
Chief Complaint  Hypertension (Meds )    History of Present Illness  Devendra Rodriguez is a 39 y.o. male who presents to [facility] with a past medical history of    Past Medical History:   Diagnosis Date    ADHD (attention deficit hyperactivity disorder)     Anxiety     Benign essential hypertension     NO MEDS NEEDED    Cyst of skin     GERD (gastroesophageal reflux disease)     Head injury     PTSD (post-traumatic stress disorder)     Suicide attempt        History of Present Illness  The patient is a 39-year-old male who presents to the office today for routine blood work and potentially medication refills related to hypertension.    He reports experiencing significant fatigue over the past 2 to 3 months, which is uncharacteristic for him. He has been sleeping until 11:00 AM on weekends, a deviation from his usual routine. He does not endorse any symptoms of sleep apnea or respiratory distress during sleep. He also does not report any snoring or cessation of breathing during sleep.     He is not currently under psychiatric care and is not receiving treatment for ADHD. He continues to struggle with anxiety, depression, and PTSD but does not endorse any suicidal or homicidal ideation. He has previously been prescribed Pristiq, Adderall, Wellbutrin, BuSpar, and Xanax. He has not tried Cymbalta or duloxetine.    He expresses a desire to resume his antihypertensive medication, having previously discontinued lisinopril due to its potent effect on his blood pressure, which resulted in him feeling lightheaded and loopy. He is currently not on any medications. He reports no chest pain or palpitations, nor does he experience headaches, dizziness, shortness of breath, or leg swelling. He does report a sensation of his heart beating in his hands when he feels his blood pressure is elevated. He notes a change in vision as well.     He fell off of a ladder last year and had a CT Thorax which  "revealed coronary artery calcifications. He is a non-smoker; has never smoked. No know history of dyslipidemia on his father's side, but they do not really discuss medical issues. Mother's side with heart disease - his maternal grandmother had HTN, DM, CHF and maternal grandfather had bypass surgery, aortic aneurysm, HTN, and pre-diabetes.     He is interested in exploring over-the-counter supplements to boost his energy levels.    SOCIAL HISTORY  He does not smoke.    FAMILY HISTORY  He is not aware of any family history of cholesterol medication use.    PHQ-9 Depression Screening  Little interest or pleasure in doing things? Nearly every day   Feeling down, depressed, or hopeless? Nearly every day   PHQ-2 Total Score 6   Trouble falling or staying asleep, or sleeping too much? Nearly every day   Feeling tired or having little energy? Nearly every day   Poor appetite or overeating? Not at all   Feeling bad about yourself - or that you are a failure or have let yourself or your family down? Not at all   Trouble concentrating on things, such as reading the newspaper or watching television? Nearly every day   Moving or speaking so slowly that other people could have noticed? Or the opposite - being so fidgety or restless that you have been moving around a lot more than usual? Not at all     Thoughts that you would be better off dead, or of hurting yourself in some way? Not at all   PHQ-9 Total Score 15   If you checked off any problems, how difficult have these problems made it for you to do your work, take care of things at home, or get along with other people?           Objective   Vital Signs:   Vitals:    05/29/25 1310   BP: 134/86   Pulse: 86   Temp: 98.6 °F (37 °C)   SpO2: 98%   Weight: 73 kg (161 lb)   Height: 172.7 cm (68\")     Body mass index is 24.48 kg/m².    Wt Readings from Last 3 Encounters:   05/29/25 73 kg (161 lb)   08/12/24 68.5 kg (151 lb)   12/12/22 73 kg (161 lb)     BP Readings from Last 3 " Encounters:   05/29/25 134/86   08/12/24 144/80   12/12/22 140/90       Health Maintenance   Topic Date Due    HEPATITIS C SCREENING  Never done    ANNUAL PHYSICAL  Never done    COVID-19 Vaccine (1 - 2024-25 season) Never done    INFLUENZA VACCINE  07/01/2025    TDAP/TD VACCINES (3 - Td or Tdap) 08/26/2029    Pneumococcal Vaccine 0-49  Aged Out       Physical Exam  Vitals reviewed.   Constitutional:       General: He is not in acute distress.     Appearance: Normal appearance. He is well-developed and normal weight. He is not ill-appearing.   HENT:      Head: Normocephalic and atraumatic.   Eyes:      General: No scleral icterus.        Right eye: No discharge.         Left eye: No discharge.      Extraocular Movements: Extraocular movements intact.      Conjunctiva/sclera: Conjunctivae normal.   Neck:      Thyroid: No thyromegaly.      Vascular: No carotid bruit.      Trachea: Trachea normal.   Cardiovascular:      Rate and Rhythm: Normal rate and regular rhythm.      Pulses: Normal pulses.      Heart sounds: No murmur heard.  Pulmonary:      Effort: Pulmonary effort is normal.      Breath sounds: Normal breath sounds. No wheezing, rhonchi or rales.   Musculoskeletal:         General: Normal range of motion.      Cervical back: Normal range of motion and neck supple. No tenderness.      Right lower leg: No edema.      Left lower leg: No edema.   Lymphadenopathy:      Cervical: No cervical adenopathy.   Skin:     General: Skin is warm and dry.   Neurological:      Mental Status: He is alert and oriented to person, place, and time.   Psychiatric:         Mood and Affect: Mood and affect normal.         Behavior: Behavior normal.         Thought Content: Thought content normal.         Judgment: Judgment normal.          Result Review :  The following data was reviewed by: HERLINDA Alfredo on 05/29/2025:    CBC & Differential (12/09/2020 09:08)  Comprehensive Metabolic Panel (12/09/2020 09:08)  Thyroid  Profile (Free T4 with TSH) (12/09/2020 09:08)  Iron Profile (12/09/2020 09:08)  Ferritin (12/09/2020 09:08)    ED notes via [facility] - xrays of right wrist, left shoulder, CT brain, cervical spine, and thorax.         ECG 12 Lead    Date/Time: 5/29/2025 1:50 PM  Performed by: Dinorah Lezama APRN    Authorized by: Dinorah Lezama APRN  Comparison: not compared with previous ECG   Previous ECG: no previous ECG available  Rhythm: sinus rhythm  Rate: normal  BPM: 61  QRS axis: normal  Other findings comments: RSR' in V1 or V2, probably normal variant    Clinical impression: normal ECG              Assessment and Plan   Diagnoses and all orders for this visit:    1. Essential hypertension (Primary)  -     amLODIPine (NORVASC) 2.5 MG tablet; Take 1 tablet by mouth Daily.  Dispense: 30 tablet; Refill: 0  -     CBC Auto Differential; Future  -     Comprehensive Metabolic Panel; Future  -     Lipid Panel; Future  -     TSH+Free T4; Future  -     Microalbumin / Creatinine Urine Ratio - Urine, Clean Catch; Future  -     ECG 12 Lead    2. Moderate episode of recurrent major depressive disorder  -     DULoxetine (CYMBALTA) 20 MG capsule; Take 1 capsule by mouth Daily.  Dispense: 30 capsule; Refill: 0  -     CBC Auto Differential; Future  -     Comprehensive Metabolic Panel; Future  -     TSH+Free T4; Future  -     Iron Profile w/o Ferritin; Future  -     Ferritin; Future  -     Vitamin B12 & Folate; Future  -     Vitamin D,25-Hydroxy; Future    3. Chronic fatigue  -     CBC Auto Differential; Future  -     Comprehensive Metabolic Panel; Future  -     TSH+Free T4; Future  -     Iron Profile w/o Ferritin; Future  -     Ferritin; Future  -     Vitamin B12 & Folate; Future  -     Vitamin D,25-Hydroxy; Future  -     Testosterone, Free, Total; Future    4. Coronary artery calcification seen on CT scan  -     Apolipoprotein B; Future  -     Lipoprotein A (LPA); Future    5. Need for hepatitis C  screening test  -     Hepatitis C Antibody; Future    6. Encounter for screening for cardiovascular disorders  -     Apolipoprotein B; Future  -     Lipoprotein A (LPA); Future  -     Vascular Screening (Bundle) CAR; Future        Assessment & Plan  1. Hypertension.  - He will be initiated on amlodipine 2.5 mg for blood pressure management.  - An EKG will be conducted today to establish a baseline.  - Reports feeling bad when blood pressure spikes, affecting vision and causing palpitations.  - Advised to start duloxetine first and then begin amlodipine after one week.    2. Anxiety, depression, and PTSD.  - He will commence duloxetine 20 mg for mood regulation.  - The potential side effects of duloxetine, including its low anticholinergic profile and minimal impact on weight gain and cardiac changes, were discussed.  - Reports ongoing issues with anxiety, depression, and PTSD but no thoughts of self-harm or harm to others.  - Advised to start duloxetine first and then begin amlodipine after one week.    3. Fatigue.  - A comprehensive lab workup will be ordered, including tests for iron, ferritin, B12, folate, vitamin D, and thyroid function.  - A testosterone level test will also be conducted, which requires fasting before 9 AM.  - Reports significant fatigue over the past 2-3 months, sleeping until 11:00 AM on weekends, which is unusual for him.  - He is scheduled to have these labs done on Saturday.    4. Mild coronary artery calcification.  - A vascular bundle screening will be arranged to assess the need for potential cardiology referral or cholesterol medication initiation.  - Discussed the findings of mild coronary artery calcification from a previous CT scan.  - No family history of cholesterol medication use was noted.  - The patient is scheduled for a follow-up visit in 4 weeks.      BMI is within normal parameters. No other follow-up for BMI required.         FOLLOW UP  Return in about 4 weeks (around  6/26/2025) for Next scheduled follow up (at 3:15PM due to work).    Patient was given instructions and counseling regarding his condition or for health maintenance advice. Please see specific information pulled into the AVS if appropriate.       HERLINDA Alfredo  05/29/25  13:51 EDT    CURRENT & DISCONTINUED MEDICATIONS  Current Outpatient Medications   Medication Instructions    amLODIPine (NORVASC) 2.5 mg, Oral, Daily    DULoxetine (CYMBALTA) 20 mg, Oral, Daily       Medications Discontinued During This Encounter   Medication Reason    lisinopril (PRINIVIL,ZESTRIL) 20 MG tablet Side effects        Patient or patient representative verbalized consent for the use of Ambient Listening during the visit with  HERLINDA Alfredo for chart documentation. 5/29/2025  13:48 EDT

## 2025-05-31 ENCOUNTER — CLINICAL SUPPORT (OUTPATIENT)
Dept: FAMILY MEDICINE CLINIC | Facility: CLINIC | Age: 40
End: 2025-05-31
Payer: COMMERCIAL

## 2025-05-31 DIAGNOSIS — R53.82 CHRONIC FATIGUE: ICD-10-CM

## 2025-05-31 DIAGNOSIS — Z13.6 ENCOUNTER FOR SCREENING FOR CARDIOVASCULAR DISORDERS: ICD-10-CM

## 2025-05-31 DIAGNOSIS — I25.10 CORONARY ARTERY CALCIFICATION SEEN ON CT SCAN: ICD-10-CM

## 2025-05-31 DIAGNOSIS — Z11.59 NEED FOR HEPATITIS C SCREENING TEST: ICD-10-CM

## 2025-05-31 DIAGNOSIS — F33.1 MODERATE EPISODE OF RECURRENT MAJOR DEPRESSIVE DISORDER: ICD-10-CM

## 2025-05-31 DIAGNOSIS — I10 ESSENTIAL HYPERTENSION: ICD-10-CM

## 2025-05-31 LAB
25(OH)D3 SERPL-MCNC: 29.5 NG/ML (ref 30–100)
ALBUMIN SERPL-MCNC: 4.6 G/DL (ref 3.5–5.2)
ALBUMIN UR-MCNC: <1.2 MG/DL
ALBUMIN/GLOB SERPL: 2.2 G/DL
ALP SERPL-CCNC: 115 U/L (ref 39–117)
ALT SERPL W P-5'-P-CCNC: 53 U/L (ref 1–41)
ANION GAP SERPL CALCULATED.3IONS-SCNC: 11 MMOL/L (ref 5–15)
AST SERPL-CCNC: 37 U/L (ref 1–40)
BASOPHILS # BLD AUTO: 0.02 10*3/MM3 (ref 0–0.2)
BASOPHILS NFR BLD AUTO: 0.3 % (ref 0–1.5)
BILIRUB SERPL-MCNC: 0.5 MG/DL (ref 0–1.2)
BUN SERPL-MCNC: 13 MG/DL (ref 6–20)
BUN/CREAT SERPL: 10.6 (ref 7–25)
CALCIUM SPEC-SCNC: 9.4 MG/DL (ref 8.6–10.5)
CHLORIDE SERPL-SCNC: 102 MMOL/L (ref 98–107)
CHOLEST SERPL-MCNC: 293 MG/DL (ref 0–200)
CO2 SERPL-SCNC: 28 MMOL/L (ref 22–29)
CREAT SERPL-MCNC: 1.23 MG/DL (ref 0.76–1.27)
CREAT UR-MCNC: 87.1 MG/DL
DEPRECATED RDW RBC AUTO: 46.2 FL (ref 37–54)
EGFRCR SERPLBLD CKD-EPI 2021: 76.1 ML/MIN/1.73
EOSINOPHIL # BLD AUTO: 0.21 10*3/MM3 (ref 0–0.4)
EOSINOPHIL NFR BLD AUTO: 3.5 % (ref 0.3–6.2)
ERYTHROCYTE [DISTWIDTH] IN BLOOD BY AUTOMATED COUNT: 13.7 % (ref 12.3–15.4)
FERRITIN SERPL-MCNC: 155 NG/ML (ref 30–400)
FOLATE SERPL-MCNC: 6.16 NG/ML (ref 4.78–24.2)
GLOBULIN UR ELPH-MCNC: 2.1 GM/DL
GLUCOSE SERPL-MCNC: 95 MG/DL (ref 65–99)
HCT VFR BLD AUTO: 43.1 % (ref 37.5–51)
HCV AB SER QL: NORMAL
HDLC SERPL-MCNC: 127 MG/DL (ref 40–60)
HGB BLD-MCNC: 14.5 G/DL (ref 13–17.7)
IMM GRANULOCYTES # BLD AUTO: 0.02 10*3/MM3 (ref 0–0.05)
IMM GRANULOCYTES NFR BLD AUTO: 0.3 % (ref 0–0.5)
IRON 24H UR-MRATE: 127 MCG/DL (ref 59–158)
IRON SATN MFR SERPL: 27 % (ref 20–50)
LDLC SERPL CALC-MCNC: 159 MG/DL (ref 0–100)
LDLC/HDLC SERPL: 1.23 {RATIO}
LYMPHOCYTES # BLD AUTO: 1.57 10*3/MM3 (ref 0.7–3.1)
LYMPHOCYTES NFR BLD AUTO: 26 % (ref 19.6–45.3)
MCH RBC QN AUTO: 30.3 PG (ref 26.6–33)
MCHC RBC AUTO-ENTMCNC: 33.6 G/DL (ref 31.5–35.7)
MCV RBC AUTO: 90 FL (ref 79–97)
MICROALBUMIN/CREAT UR: NORMAL MG/G{CREAT}
MONOCYTES # BLD AUTO: 0.42 10*3/MM3 (ref 0.1–0.9)
MONOCYTES NFR BLD AUTO: 7 % (ref 5–12)
NEUTROPHILS NFR BLD AUTO: 3.79 10*3/MM3 (ref 1.7–7)
NEUTROPHILS NFR BLD AUTO: 62.9 % (ref 42.7–76)
NRBC BLD AUTO-RTO: 0 /100 WBC (ref 0–0.2)
PLATELET # BLD AUTO: 270 10*3/MM3 (ref 140–450)
PMV BLD AUTO: 9.8 FL (ref 6–12)
POTASSIUM SERPL-SCNC: 4.9 MMOL/L (ref 3.5–5.2)
PROT SERPL-MCNC: 6.7 G/DL (ref 6–8.5)
RBC # BLD AUTO: 4.79 10*6/MM3 (ref 4.14–5.8)
SODIUM SERPL-SCNC: 141 MMOL/L (ref 136–145)
T4 FREE SERPL-MCNC: 1.03 NG/DL (ref 0.92–1.68)
TIBC SERPL-MCNC: 468 MCG/DL (ref 298–536)
TRANSFERRIN SERPL-MCNC: 314 MG/DL (ref 200–360)
TRIGL SERPL-MCNC: 52 MG/DL (ref 0–150)
TSH SERPL DL<=0.05 MIU/L-ACNC: 2.39 UIU/ML (ref 0.27–4.2)
VIT B12 BLD-MCNC: 419 PG/ML (ref 211–946)
VLDLC SERPL-MCNC: 7 MG/DL (ref 5–40)
WBC NRBC COR # BLD AUTO: 6.03 10*3/MM3 (ref 3.4–10.8)

## 2025-05-31 PROCEDURE — 82306 VITAMIN D 25 HYDROXY: CPT | Performed by: NURSE PRACTITIONER

## 2025-05-31 PROCEDURE — 83540 ASSAY OF IRON: CPT | Performed by: NURSE PRACTITIONER

## 2025-05-31 PROCEDURE — 84402 ASSAY OF FREE TESTOSTERONE: CPT | Performed by: NURSE PRACTITIONER

## 2025-05-31 PROCEDURE — 83695 ASSAY OF LIPOPROTEIN(A): CPT | Performed by: NURSE PRACTITIONER

## 2025-05-31 PROCEDURE — 84403 ASSAY OF TOTAL TESTOSTERONE: CPT | Performed by: NURSE PRACTITIONER

## 2025-05-31 PROCEDURE — 84466 ASSAY OF TRANSFERRIN: CPT | Performed by: NURSE PRACTITIONER

## 2025-05-31 PROCEDURE — 84439 ASSAY OF FREE THYROXINE: CPT | Performed by: NURSE PRACTITIONER

## 2025-05-31 PROCEDURE — 86803 HEPATITIS C AB TEST: CPT | Performed by: NURSE PRACTITIONER

## 2025-05-31 PROCEDURE — 80050 GENERAL HEALTH PANEL: CPT | Performed by: NURSE PRACTITIONER

## 2025-05-31 PROCEDURE — 82172 ASSAY OF APOLIPOPROTEIN: CPT | Performed by: NURSE PRACTITIONER

## 2025-05-31 PROCEDURE — 82043 UR ALBUMIN QUANTITATIVE: CPT | Performed by: NURSE PRACTITIONER

## 2025-05-31 PROCEDURE — 82746 ASSAY OF FOLIC ACID SERUM: CPT | Performed by: NURSE PRACTITIONER

## 2025-05-31 PROCEDURE — 82728 ASSAY OF FERRITIN: CPT | Performed by: NURSE PRACTITIONER

## 2025-05-31 PROCEDURE — 80061 LIPID PANEL: CPT | Performed by: NURSE PRACTITIONER

## 2025-05-31 PROCEDURE — 36415 COLL VENOUS BLD VENIPUNCTURE: CPT | Performed by: NURSE PRACTITIONER

## 2025-05-31 PROCEDURE — 82570 ASSAY OF URINE CREATININE: CPT | Performed by: NURSE PRACTITIONER

## 2025-05-31 PROCEDURE — 82607 VITAMIN B-12: CPT | Performed by: NURSE PRACTITIONER

## 2025-05-31 NOTE — PROGRESS NOTES
Venipuncture Blood Specimen Collection  Venipuncture performed in left arm by Lorrie Goodwin with good hemostasis. Patient tolerated the procedure well without complications.   05/31/25   Lorrie Goodwin

## 2025-06-02 ENCOUNTER — RESULTS FOLLOW-UP (OUTPATIENT)
Dept: FAMILY MEDICINE CLINIC | Facility: CLINIC | Age: 40
End: 2025-06-02
Payer: COMMERCIAL

## 2025-06-02 DIAGNOSIS — E78.41 ELEVATED LIPOPROTEIN(A): ICD-10-CM

## 2025-06-02 DIAGNOSIS — E78.5 DYSLIPIDEMIA, GOAL LDL BELOW 70: ICD-10-CM

## 2025-06-02 DIAGNOSIS — R79.89 ELEVATED LFTS: ICD-10-CM

## 2025-06-02 DIAGNOSIS — R79.89 LOW TESTOSTERONE: ICD-10-CM

## 2025-06-02 DIAGNOSIS — E78.5 DYSLIPIDEMIA, GOAL LDL BELOW 100: ICD-10-CM

## 2025-06-02 DIAGNOSIS — E55.9 VITAMIN D DEFICIENCY: Primary | ICD-10-CM

## 2025-06-02 RX ORDER — CHOLECALCIFEROL (VITAMIN D3) 1250 MCG
50000 CAPSULE ORAL
Qty: 13 CAPSULE | Refills: 0 | Status: SHIPPED | OUTPATIENT
Start: 2025-06-02

## 2025-06-02 NOTE — LETTER
"Devendra Rodriguez  [id number] [facility] [id number]    June 2, 2025     Dear Mr. Rodriguez:    Devendra \"Huber\",      Vitamin D is low at 29.5.  I will send to the pharmacy weekly vitamin D for you to take and we will plan to repeat your vitamin D in 12 weeks.     CMP is within normal limits with the exception of your ALT which is slightly elevated at 53.  I will repeat this when we repeat the vitamin D.     Lipid profile shows total cholesterol 293, Triglycerides 52, , .  HDL is pretty high - I am still waiting on lipoprotein A and apolipoprotein B, but I likely will recommend lipid-lowering medication such as rosuvastatin.  I will message you again once I get the lipoprotein's back. I have enclosed a handout for a low-cholesterol diet.     B12 and folate are normal.  Hep C is negative/nonreactive.  Thyroid profile normal.  Iron profile and ferritin also normal.  Urine microalbumin/creatinine normal.  CBC completely normal.     Testosterone panel also pending.        High Cholesterol     High cholesterol is a condition in which the blood has high levels of a white, waxy substance similar to fat (cholesterol). The liver makes all the cholesterol that the body needs. The human body needs small amounts of cholesterol to help build cells. A person gets extra or excess cholesterol from the food that he or she eats.     The blood carriescholesterol from the liver to the rest of the body. If you have high cholesterol, deposits (plaques) may build up on the walls of your arteries. Arteries are the blood vessels that carry blood away from your heart. These plaques make the arteries narrow and stiff.     Cholesterol plaques increase your risk for heart attack and stroke. Work with your health care provider to keep your cholesterol levels in a healthy range.     What increases the risk?  The following factors may make you more likely to develop thiscondition:  Eating foods that are high in animal fat (saturated " "fat) or cholesterol.  Being overweight.  Not getting enough exercise.  A family history of high cholesterol (familial hypercholesterolemia).  Use of tobacco products.  Having diabetes.     What are the signs or symptoms?  There are no symptoms of this condition.     How is this diagnosed?  This condition may be diagnosed based on the results of a bloodtest.  If you are older than 20 years of age, your health care provider may check your cholesterol levels every 4-6 years.  You may be checked more often if you have high cholesterol or other risk factors for heart disease.     The blood test for cholesterol measures:  \"Bad\" cholesterol, or LDL cholesterol. This is the main type of cholesterol that causes heart disease. The desired level is less than 100 mg/dL.  \"Good\" cholesterol, or HDL cholesterol. HDL helps protect against heart disease by cleaning the arteries and carrying the LDL to the liver for processing. The desired level for HDL is 60 mg/dL or higher.  Triglycerides. These are fats that your body can store or burn for energy. The desired level is less than 150 mg/dL.  Total cholesterol. This measures the total amount of cholesterol in your blood and includes LDL, HDL, and triglycerides. The desired level is less than 200 mg/dL.     How is this treated?  This condition may be treated with:  Diet changes. You may be asked to eat foods that have more fiber and less saturated fats or added sugar.  Lifestyle changes. These may include regular exercise, maintaining a healthy weight, and quitting use of tobacco products.  Medicines. These are given when diet and lifestyle changes have not worked. You may be prescribed a statin medicine to help lower your cholesterol levels.     Follow these instructions at home:  Eatingand drinking     Eat a healthy, balanced diet. This diet includes:  Daily servings of a variety of fresh, frozen, or canned fruits and vegetables.  Daily servings of whole grain foods that are rich " "in fiber.  Foods that are low in saturated fats and trans fats. These include poultry and fish without skin, lean cuts of meat, and low-fat dairy products.  A variety of fish, especially oily fish that contain omega-3 fatty acids. Aim to eat fish at least2 times a week.  Avoid foods and drinks that have added sugar.  Use healthy cooking methods, such as roasting, grilling, broiling, baking, poaching, steaming, and stir-frying. Do not turner your food except for stir-frying.     Lifestyle     Get regular exercise. Aim to exercise for a total of 150 minutes a week. Increase your activity level by doing activities such as gardening, walking, and taking the stairs.  Do not use anyproducts that contain nicotine or tobacco, such as cigarettes, e-cigarettes, and chewing tobacco. If you need help quitting, ask your health care provider.     General instructions  Take over-the-counter and prescription medicines only as told by your health care provider.  Keep all follow-up visits as told by your health care provider. This is important.     Where to find moreinformation  American Heart Association: www.heart.org  National Heart, Lung, and Blood [place]: www.nhlbi.nih.gov     Contact a health care provider if:  Youhave trouble achieving or maintaining a healthy diet or weight.  You are starting an exercise program.  You are unable to stop smoking.  Get help right away if:  You have chest pain.  You have trouble breathing.  You have any symptoms of a stroke. \"BE FAST\" is an easy way to remember the main warning signs of a stroke:  B - Balance. Signs are dizziness, sudden trouble walking, or loss of balance.  E - Eyes. Signs aretrouble seeing or a sudden change in vision.  F - Face. Signs are sudden weakness or numbness of the face, or the face or eyelid drooping on one side.  A - Arms. Signs are weakness or numbness in an arm. This happens suddenly and usually on one side of the body.  S - Speech. Signs are sudden trouble " "speaking, slurred speech, or trouble understanding what people say.  T - Time. Time to call emergency services. Write down what time symptoms started.  You have other signs of a stroke, suchas:  A sudden, severe headache with no known cause.  Nausea or vomiting.  Seizure.     These symptoms may represent a serious problem that is an emergency. Do not wait to see if the symptoms will go away. Get medical help right away. Call your local emergency services (911 in the [place] Do not drive yourself to the hospital.  Summary  Cholesterol plaques increase your risk for heart attack and stroke. Work with your health care provider tokeep your cholesterol levels in a healthy range.  Eat a healthy, balanced diet, get regular exercise, and maintain a healthy weight.  Do not use any products that contain nicotine or tobacco, such as cigarettes, e-cigarettes, and chewing tobacco.  Get help right away if you have any symptoms of a stroke.     This information is not intended to replace advice given to you by your health care provider. Make sure you discuss any questions you have with your health care provider.  Document Revised: 11/16/2020Document Reviewed: 11/16/2020  [employer] Patient Education © 2021 [employer]  3 please note course  Vitamin B12 & Folate; Vitamin D,25-Hydroxy; Hepatitis C Antibody; TSH+Free T4; Ferritin (5 more orders)  Dinorah Lezama, APRN to Devendra Rodriguez \"Huber\"        6/2/25  2:53 PM  Devendra \"Timnath\",      Vitamin D is low at 29.5.  I will send to the pharmacy weekly vitamin D for you to take and we will plan to repeat your vitamin D in 12 weeks.     CMP is within normal limits with the exception of your ALT which is slightly elevated at 53.  I will repeat this when we repeat the vitamin D.     Lipid profile shows total cholesterol 293, Triglycerides 52, , .  HDL is pretty high - I am still waiting on lipoprotein A and apolipoprotein B, but I likely will recommend lipid-lowering " medication such as rosuvastatin.  I will message you again once I get the lipoprotein's back. I have enclosed a handout for a low-cholesterol diet.     B12 and folate are normal.  Hep C is negative/nonreactive.  Thyroid profile normal.  Iron profile and ferritin also normal.  Urine microalbumin/creatinine normal.  CBC completely normal.     Testosterone panel also pending.        High Cholesterol     High cholesterol is a condition in which the blood has high levels of a white, waxy substance similar to fat (cholesterol). The liver makes all the cholesterol that the body needs. The human body needs small amounts of cholesterol to help build cells. A person gets extra or excess cholesterol from the food that he or she eats.     The blood carriescholesterol from the liver to the rest of the body. If you have high cholesterol, deposits (plaques) may build up on the walls of your arteries. Arteries are the blood vessels that carry blood away from your heart. These plaques make the arteries narrow and stiff.     Cholesterol plaques increase your risk for heart attack and stroke. Work with your health care provider to keep your cholesterol levels in a healthy range.     What increases the risk?  The following factors may make you more likely to develop thiscondition:  Eating foods that are high in animal fat (saturated fat) or cholesterol.  Being overweight.  Not getting enough exercise.  A family history of high cholesterol (familial hypercholesterolemia).  Use of tobacco products.  Having diabetes.     What are the signs or symptoms?  There are no symptoms of this condition.     How is this diagnosed?  This condition may be diagnosed based on the results of a bloodtest.  If you are older than 20 years of age, your health care provider may check your cholesterol levels every 4-6 years.  You may be checked more often if you have high cholesterol or other risk factors for heart disease.     The blood test for cholesterol  "measures:  \"Bad\" cholesterol, or LDL cholesterol. This is the main type of cholesterol that causes heart disease. The desired level is less than 100 mg/dL.  \"Good\" cholesterol, or HDL cholesterol. HDL helps protect against heart disease by cleaning the arteries and carrying the LDL to the liver for processing. The desired level for HDL is 60 mg/dL or higher.  Triglycerides. These are fats that your body can store or burn for energy. The desired level is less than 150 mg/dL.  Total cholesterol. This measures the total amount of cholesterol in your blood and includes LDL, HDL, and triglycerides. The desired level is less than 200 mg/dL.     How is this treated?  This condition may be treated with:  Diet changes. You may be asked to eat foods that have more fiber and less saturated fats or added sugar.  Lifestyle changes. These may include regular exercise, maintaining a healthy weight, and quitting use of tobacco products.  Medicines. These are given when diet and lifestyle changes have not worked. You may be prescribed a statin medicine to help lower your cholesterol levels.     Follow these instructions at home:  Eatingand drinking     Eat a healthy, balanced diet. This diet includes:  Daily servings of a variety of fresh, frozen, or canned fruits and vegetables.  Daily servings of whole grain foods that are rich in fiber.  Foods that are low in saturated fats and trans fats. These include poultry and fish without skin, lean cuts of meat, and low-fat dairy products.  A variety of fish, especially oily fish that contain omega-3 fatty acids. Aim to eat fish at least2 times a week.  Avoid foods and drinks that have added sugar.  Use healthy cooking methods, such as roasting, grilling, broiling, baking, poaching, steaming, and stir-frying. Do not turner your food except for stir-frying.     Lifestyle     Get regular exercise. Aim to exercise for a total of 150 minutes a week. Increase your activity level by doing " "activities such as gardening, walking, and taking the stairs.  Do not use anyproducts that contain nicotine or tobacco, such as cigarettes, e-cigarettes, and chewing tobacco. If you need help quitting, ask your health care provider.     General instructions  Take over-the-counter and prescription medicines only as told by your health care provider.  Keep all follow-up visits as told by your health care provider. This is important.     Where to find moreinformation  American Heart Association: www.heart.org  National Heart, Lung, and Blood [place]: www.nhlbi.nih.gov     Contact a health care provider if:  Youhave trouble achieving or maintaining a healthy diet or weight.  You are starting an exercise program.  You are unable to stop smoking.  Get help right away if:  You have chest pain.  You have trouble breathing.  You have any symptoms of a stroke. \"BE FAST\" is an easy way to remember the main warning signs of a stroke:  B - Balance. Signs are dizziness, sudden trouble walking, or loss of balance.  E - Eyes. Signs aretrouble seeing or a sudden change in vision.  F - Face. Signs are sudden weakness or numbness of the face, or the face or eyelid drooping on one side.  A - Arms. Signs are weakness or numbness in an arm. This happens suddenly and usually on one side of the body.  S - Speech. Signs are sudden trouble speaking, slurred speech, or trouble understanding what people say.  T - Time. Time to call emergency services. Write down what time symptoms started.  You have other signs of a stroke, suchas:  A sudden, severe headache with no known cause.  Nausea or vomiting.  Seizure.     These symptoms may represent a serious problem that is an emergency. Do not wait to see if the symptoms will go away. Get medical help right away. Call your local emergency services (911 in the [place] Do not drive yourself to the hospital.  Summary  Cholesterol plaques increase your risk for heart attack and stroke. Work with your " health care provider tokeep your cholesterol levels in a healthy range.  Eat a healthy, balanced diet, get regular exercise, and maintain a healthy weight.  Do not use any products that contain nicotine or tobacco, such as cigarettes, e-cigarettes, and chewing tobacco.  Get help right away if you have any symptoms of a stroke.     This information is not intended to replace advice given to you by your health care provider. Make sure you discuss any questions you have with your health care provider.  Document Revised: 11/16/2020Document Reviewed: 11/16/2020  [employer] Patient Education © 2021 [employer]       Resulted Orders   CBC Auto Differential   Result Value Ref Range    WBC 6.03 3.40 - 10.80 10*3/mm3    RBC 4.79 4.14 - 5.80 10*6/mm3    Hemoglobin 14.5 13.0 - 17.7 g/dL    Hematocrit 43.1 37.5 - 51.0 %    MCV 90.0 79.0 - 97.0 fL    MCH 30.3 26.6 - 33.0 pg    MCHC 33.6 31.5 - 35.7 g/dL    RDW 13.7 12.3 - 15.4 %    RDW-SD 46.2 37.0 - 54.0 fl    MPV 9.8 6.0 - 12.0 fL    Platelets 270 140 - 450 10*3/mm3    Neutrophil % 62.9 42.7 - 76.0 %    Lymphocyte % 26.0 19.6 - 45.3 %    Monocyte % 7.0 5.0 - 12.0 %    Eosinophil % 3.5 0.3 - 6.2 %    Basophil % 0.3 0.0 - 1.5 %    Immature Grans % 0.3 0.0 - 0.5 %    Neutrophils, Absolute 3.79 1.70 - 7.00 10*3/mm3    Lymphocytes, Absolute 1.57 0.70 - 3.10 10*3/mm3    Monocytes, Absolute 0.42 0.10 - 0.90 10*3/mm3    Eosinophils, Absolute 0.21 0.00 - 0.40 10*3/mm3    Basophils, Absolute 0.02 0.00 - 0.20 10*3/mm3    Immature Grans, Absolute 0.02 0.00 - 0.05 10*3/mm3    nRBC 0.0 0.0 - 0.2 /100 WBC   Comprehensive Metabolic Panel   Result Value Ref Range    Glucose 95 65 - 99 mg/dL    BUN 13.0 6.0 - 20.0 mg/dL    Creatinine 1.23 0.76 - 1.27 mg/dL    Sodium 141 136 - 145 mmol/L    Potassium 4.9 3.5 - 5.2 mmol/L    Chloride 102 98 - 107 mmol/L    CO2 28.0 22.0 - 29.0 mmol/L    Calcium 9.4 8.6 - 10.5 mg/dL    Total Protein 6.7 6.0 - 8.5 g/dL    Albumin 4.6 3.5 - 5.2 g/dL    ALT (SGPT) 53  (H) 1 - 41 U/L    AST (SGOT) 37 1 - 40 U/L    Alkaline Phosphatase 115 39 - 117 U/L    Total Bilirubin 0.5 0.0 - 1.2 mg/dL    Globulin 2.1 gm/dL    A/G Ratio 2.2 g/dL    BUN/Creatinine Ratio 10.6 7.0 - 25.0    Anion Gap 11.0 5.0 - 15.0 mmol/L    eGFR 76.1 >60.0 mL/min/1.73   Lipid Panel   Result Value Ref Range    Total Cholesterol 293 (H) 0 - 200 mg/dL    Triglycerides 52 0 - 150 mg/dL    HDL Cholesterol 127 (H) 40 - 60 mg/dL    LDL Cholesterol  159 (H) 0 - 100 mg/dL    VLDL Cholesterol 7 5 - 40 mg/dL    LDL/HDL Ratio 1.23    TSH+Free T4   Result Value Ref Range    TSH 2.390 0.270 - 4.200 uIU/mL    Free T4 1.03 0.92 - 1.68 ng/dL   Microalbumin / Creatinine Urine Ratio - Urine, Clean Catch   Result Value Ref Range    Microalbumin/Creatinine Ratio        Comment:      Unable to calculate    Creatinine, Urine 87.1 mg/dL    Microalbumin, Urine <1.2 mg/dL   Iron Profile w/o Ferritin   Result Value Ref Range    Iron 127 59 - 158 mcg/dL    Iron Saturation (TSAT) 27 20 - 50 %    Transferrin 314 200 - 360 mg/dL    TIBC 468 298 - 536 mcg/dL   Ferritin   Result Value Ref Range    Ferritin 155.00 30.00 - 400.00 ng/mL   Vitamin B12 & Folate   Result Value Ref Range    Folate 6.16 4.78 - 24.20 ng/mL    Vitamin B-12 419 211 - 946 pg/mL   Vitamin D,25-Hydroxy   Result Value Ref Range    25 Hydroxy, Vitamin D 29.5 (L) 30.0 - 100.0 ng/ml   Hepatitis C Antibody   Result Value Ref Range    Hepatitis C Ab Non-Reactive Non-Reactive         If you have any questions or concerns, please don't hesitate to call.         Sincerely,        Dinorah Lezama, APRN

## 2025-06-03 LAB — LPA SERPL-SCNC: 224.9 NMOL/L

## 2025-06-04 LAB
APO B SERPL-MCNC: 110 MG/DL
TESTOST FREE SERPL-MCNC: 2.2 PG/ML (ref 6.8–21.5)
TESTOST SERPL-MCNC: 168 NG/DL (ref 264–916)

## 2025-06-04 RX ORDER — ROSUVASTATIN CALCIUM 10 MG/1
10 TABLET, COATED ORAL NIGHTLY
Qty: 90 TABLET | Refills: 0 | Status: SHIPPED | OUTPATIENT
Start: 2025-06-04

## 2025-06-26 ENCOUNTER — OFFICE VISIT (OUTPATIENT)
Dept: FAMILY MEDICINE CLINIC | Facility: CLINIC | Age: 40
End: 2025-06-26
Payer: COMMERCIAL

## 2025-06-26 VITALS
BODY MASS INDEX: 24.33 KG/M2 | WEIGHT: 160 LBS | HEART RATE: 83 BPM | DIASTOLIC BLOOD PRESSURE: 86 MMHG | OXYGEN SATURATION: 99 % | TEMPERATURE: 98 F | SYSTOLIC BLOOD PRESSURE: 136 MMHG

## 2025-06-26 DIAGNOSIS — E55.9 VITAMIN D DEFICIENCY: ICD-10-CM

## 2025-06-26 DIAGNOSIS — E78.5 DYSLIPIDEMIA, GOAL LDL BELOW 70: ICD-10-CM

## 2025-06-26 DIAGNOSIS — F33.1 MODERATE EPISODE OF RECURRENT MAJOR DEPRESSIVE DISORDER: ICD-10-CM

## 2025-06-26 DIAGNOSIS — R79.89 LOW TESTOSTERONE: ICD-10-CM

## 2025-06-26 DIAGNOSIS — E78.41 ELEVATED LIPOPROTEIN(A): ICD-10-CM

## 2025-06-26 DIAGNOSIS — I10 ESSENTIAL HYPERTENSION: Primary | ICD-10-CM

## 2025-06-26 DIAGNOSIS — R79.89 ELEVATED LFTS: ICD-10-CM

## 2025-06-26 RX ORDER — AMLODIPINE BESYLATE 5 MG/1
5 TABLET ORAL DAILY
Qty: 30 TABLET | Refills: 0 | Status: SHIPPED | OUTPATIENT
Start: 2025-06-26

## 2025-06-26 RX ORDER — DULOXETIN HYDROCHLORIDE 20 MG/1
20 CAPSULE, DELAYED RELEASE ORAL DAILY
Qty: 30 CAPSULE | Refills: 0 | Status: SHIPPED | OUTPATIENT
Start: 2025-06-26

## 2025-06-26 NOTE — PROGRESS NOTES
Chief Complaint  Hypertension (Follow up on medications )    History of Present Illness  Devendra Rodriguez is a 39 y.o. male who presents to [facility] with a past medical history of  Past Medical History:   Diagnosis Date    ADHD (attention deficit hyperactivity disorder)     Anxiety     Benign essential hypertension     NO MEDS NEEDED    Cyst of skin     GERD (gastroesophageal reflux disease)     Head injury     PTSD (post-traumatic stress disorder)     Suicide attempt        History of Present Illness  The patient presents to the office today for follow-up on chronic health conditions including hypertension and dyslipidemia.    He reports no significant improvement in his blood pressure readings, despite adhering to his prescribed medication regimen. He has been on antihypertensive therapy for the past 3 weeks, which has resulted in a reduction of the sensation of his heart beating in his hands. He reports no chest pain, palpitations, headache, dizziness, shortness of breath, or leg swelling. He also mentions experiencing significant fatigue and changes in vision previously.    He has recently initiated treatment with rosuvastatin for his elevated cholesterol levels. He acknowledges receiving lab results indicating high cholesterol and expresses concern about his cardiovascular risk. He has been informed about the need for additional screening tests, including vascular screening and a CT calcium score.    Additionally, he has started taking duloxetine but has not yet observed any noticeable changes in his mood. He expresses a preference to maintain the current dosage of duloxetine until a period of 6 weeks has elapsed.    He inquires about the possibility of natural over-the-counter supplements that could help improve his condition. He has been informed about the potential role of vitamin D, zinc, and selenium in supporting testosterone levels and overall  health.        Objective   Vital Signs:   Vitals:    06/26/25 1518   BP: 136/86   Pulse: 83   Temp: 98 °F (36.7 °C)   SpO2: 99%   Weight: 72.6 kg (160 lb)     Body mass index is 24.33 kg/m².    Wt Readings from Last 3 Encounters:   06/26/25 72.6 kg (160 lb)   05/29/25 73 kg (161 lb)   08/12/24 68.5 kg (151 lb)     BP Readings from Last 3 Encounters:   06/26/25 136/86   05/29/25 134/86   08/12/24 144/80       Health Maintenance   Topic Date Due    ANNUAL PHYSICAL  Never done    COVID-19 Vaccine (1 - 2024-25 season) Never done    INFLUENZA VACCINE  07/01/2025    LIPID PANEL  05/31/2026    TDAP/TD VACCINES (3 - Td or Tdap) 08/26/2029    HEPATITIS C SCREENING  Completed    Pneumococcal Vaccine 0-49  Aged Out       Physical Exam  Vitals reviewed.   Constitutional:       General: He is not in acute distress.     Appearance: Normal appearance. He is well-developed and normal weight. He is not ill-appearing.   HENT:      Head: Normocephalic and atraumatic.   Eyes:      General: No scleral icterus.        Right eye: No discharge.         Left eye: No discharge.      Extraocular Movements: Extraocular movements intact.      Conjunctiva/sclera: Conjunctivae normal.   Cardiovascular:      Rate and Rhythm: Normal rate and regular rhythm.      Pulses: Normal pulses.      Heart sounds: No murmur heard.  Pulmonary:      Effort: Pulmonary effort is normal.      Breath sounds: Normal breath sounds. No wheezing, rhonchi or rales.   Musculoskeletal:         General: Normal range of motion.      Cervical back: Normal range of motion.      Right lower leg: No edema.      Left lower leg: No edema.   Skin:     General: Skin is warm and dry.   Neurological:      Mental Status: He is alert and oriented to person, place, and time.   Psychiatric:         Mood and Affect: Mood and affect normal.         Behavior: Behavior normal.         Thought Content: Thought content normal.         Judgment: Judgment normal.          Result Review :  The  following data was reviewed by: Dinorah Lezama, APRN on 06/26/2025:    Clinical Support on 05/31/2025   Component Date Value    WBC 05/31/2025 6.03     RBC 05/31/2025 4.79     Hemoglobin 05/31/2025 14.5     Hematocrit 05/31/2025 43.1     MCV 05/31/2025 90.0     MCH 05/31/2025 30.3     MCHC 05/31/2025 33.6     RDW 05/31/2025 13.7     RDW-SD 05/31/2025 46.2     MPV 05/31/2025 9.8     Platelets 05/31/2025 270     Neutrophil % 05/31/2025 62.9     Lymphocyte % 05/31/2025 26.0     Monocyte % 05/31/2025 7.0     Eosinophil % 05/31/2025 3.5     Basophil % 05/31/2025 0.3     Immature Grans % 05/31/2025 0.3     Neutrophils, Absolute 05/31/2025 3.79     Lymphocytes, Absolute 05/31/2025 1.57     Monocytes, Absolute 05/31/2025 0.42     Eosinophils, Absolute 05/31/2025 0.21     Basophils, Absolute 05/31/2025 0.02     Immature Grans, Absolute 05/31/2025 0.02     nRBC 05/31/2025 0.0     Glucose 05/31/2025 95     BUN 05/31/2025 13.0     Creatinine 05/31/2025 1.23     Sodium 05/31/2025 141     Potassium 05/31/2025 4.9     Chloride 05/31/2025 102     CO2 05/31/2025 28.0     Calcium 05/31/2025 9.4     Total Protein 05/31/2025 6.7     Albumin 05/31/2025 4.6     ALT (SGPT) 05/31/2025 53 (H)     AST (SGOT) 05/31/2025 37     Alkaline Phosphatase 05/31/2025 115     Total Bilirubin 05/31/2025 0.5     Globulin 05/31/2025 2.1     A/G Ratio 05/31/2025 2.2     BUN/Creatinine Ratio 05/31/2025 10.6     Anion Gap 05/31/2025 11.0     eGFR 05/31/2025 76.1     Total Cholesterol 05/31/2025 293 (H)     Triglycerides 05/31/2025 52     HDL Cholesterol 05/31/2025 127 (H)     LDL Cholesterol  05/31/2025 159 (H)     VLDL Cholesterol 05/31/2025 7     LDL/HDL Ratio 05/31/2025 1.23     TSH 05/31/2025 2.390     Free T4 05/31/2025 1.03     Microalbumin/Creatinine * 05/31/2025      Creatinine, Urine 05/31/2025 87.1     Microalbumin, Urine 05/31/2025 <1.2     Iron 05/31/2025 127     Iron Saturation (TSAT) 05/31/2025 27     Transferrin 05/31/2025 314     TIBC  05/31/2025 468     Ferritin 05/31/2025 155.00     Folate 05/31/2025 6.16     Vitamin B-12 05/31/2025 419     25 Hydroxy, Vitamin D 05/31/2025 29.5 (L)     Hepatitis C Ab 05/31/2025 Non-Reactive     Testosterone, Total 05/31/2025 168 (L)     Testosterone, Free 05/31/2025 2.2 (L)     Apolipoprotein B 05/31/2025 110 (H)     Lipoprotein (a) 05/31/2025 224.9 (H)        Procedures        Assessment and Plan   Diagnoses and all orders for this visit:    1. Essential hypertension (Primary)  -     amLODIPine (NORVASC) 5 MG tablet; Take 1 tablet by mouth Daily.  Dispense: 30 tablet; Refill: 0  -     CT Cardiac Calcium Score Without Dye; Future    2. Moderate episode of recurrent major depressive disorder  -     DULoxetine (CYMBALTA) 20 MG capsule; Take 1 capsule by mouth Daily.  Dispense: 30 capsule; Refill: 0    3. Low testosterone  -     Testosterone, Free, Total; Future  -     Sex Horm Binding Globulin; Future  -     Selenium Whole Blood; Future  -     Estradiol; Future  -     CT Cardiac Calcium Score Without Dye; Future  -     Zinc, RBC; Future    4. Dyslipidemia, goal LDL below 70  -     CT Cardiac Calcium Score Without Dye; Future    5. Elevated lipoprotein(a)  -     CT Cardiac Calcium Score Without Dye; Future    6. Elevated LFTs    7. Vitamin D deficiency        Assessment & Plan  1. Hypertension.  - Blood pressure readings have remained consistent, with current readings at 134/86 and 136/86.  - Physical exam findings indicate no significant changes in blood pressure from previous visits.  - Discussion included the target blood pressure of <135/85 and monitoring at home during calm evening hours.  - Dosage of amlodipine will be increased to 5 mg, and a new prescription will be provided.    2. Dyslipidemia.  - Lipid profile shows elevated HDL levels and positive APO B and lipoprotein A, increasing cardiovascular risk.  - Lab results indicate total cholesterol at 293, HDL at 127, and LDL at 159.  - Discussion included  ordering a vascular screening bundle and CT calcium score to assess cardiovascular risk further.  - Currently taking rosuvastatin 10 mg for cholesterol management.    3. Low testosterone.  - Total testosterone level is 168, below the normal range of 300-800.  - Physical exam findings include fatigue and other symptoms potentially related to low testosterone.  - Discussion included ordering repeat testosterone tests, estradiol, sex hormone-binding globulin, selenium, and zinc.  - If repeat tests confirm low testosterone, testosterone replacement therapy may be considered.    4. Vitamin D deficiency.  - Vitamin D level is 29.5, below the optimal range of 50-80.  - Lab results indicate marginal vitamin D levels, with folate at 6.16 and B12 at 419.  - Discussion included continuing vitamin D supplements and considering additional supplements like vitamin C, zinc, and selenium based on lab results.  - Vitamin D supplementation will be maintained.    5. Medication management.  - Currently taking duloxetine 20 mg for mood stabilization.  - No significant changes in mood reported yet, as it has only been three weeks since starting the medication.  - Discussion included maintaining the current dosage and providing a refill.  - Follow-up appointment scheduled for 7 weeks.      BMI is within normal parameters. No other follow-up for BMI required.         FOLLOW UP  Return in about 7 weeks (around 8/16/2025) for Next scheduled follow up.    Patient was given instructions and counseling regarding his condition or for health maintenance advice. Please see specific information pulled into the AVS if appropriate.       Dinorah Lezama, APRN  06/26/25  16:13 EDT    CURRENT & DISCONTINUED MEDICATIONS  Current Outpatient Medications   Medication Instructions    amLODIPine (NORVASC) 5 mg, Oral, Daily    DULoxetine (CYMBALTA) 20 mg, Oral, Daily    rosuvastatin (CRESTOR) 10 mg, Oral, Nightly    Vitamin D3 50,000 Units, Oral, Every 7  Days       Medications Discontinued During This Encounter   Medication Reason    amLODIPine (NORVASC) 2.5 MG tablet Reorder    DULoxetine (CYMBALTA) 20 MG capsule Reorder        Patient or patient representative verbalized consent for the use of Ambient Listening during the visit with  HERLINDA Alfredo for chart documentation. 6/26/2025  15:56 EDT

## (undated) DEVICE — INTENDED FOR TISSUE SEPARATION, AND OTHER PROCEDURES THAT REQUIRE A SHARP SURGICAL BLADE TO PUNCTURE OR CUT.: Brand: BARD-PARKER ® CARBON RIB-BACK BLADES

## (undated) DEVICE — ANTIBACTERIAL VIOLET BRAIDED (POLYGLACTIN 910), SYNTHETIC ABSORBABLE SUTURE: Brand: COATED VICRYL

## (undated) DEVICE — STERILE POLYISOPRENE POWDER-FREE SURGICAL GLOVES WITH EMOLLIENT COATING: Brand: PROTEXIS

## (undated) DEVICE — ANTIBACTERIAL UNDYED BRAIDED (POLYGLACTIN 910), SYNTHETIC ABSORBABLE SUTURE: Brand: COATED VICRYL

## (undated) DEVICE — MAJOR-LF: Brand: MEDLINE INDUSTRIES, INC.

## (undated) DEVICE — GOWN,REINFORCE,POLY,SIRUS,BREATH SLV,XLG: Brand: MEDLINE

## (undated) DEVICE — ELECTRD BLD EDGE COAT 3IN

## (undated) DEVICE — 3M™ STERI-STRIP™ REINFORCED ADHESIVE SKIN CLOSURES, R1547, 1/2 IN X 4 IN (12 MM X 100 MM), 6 STRIPS/ENVELOPE: Brand: 3M™ STERI-STRIP™

## (undated) DEVICE — ADHS LIQ MASTISOL 2/3ML

## (undated) DEVICE — PENCL E/S SMOKEEVAC W/TELESCP CANN